# Patient Record
Sex: FEMALE | Race: WHITE | NOT HISPANIC OR LATINO | RURAL
[De-identification: names, ages, dates, MRNs, and addresses within clinical notes are randomized per-mention and may not be internally consistent; named-entity substitution may affect disease eponyms.]

---

## 2022-01-24 ENCOUNTER — OFFICE VISIT (OUTPATIENT)
Dept: FAMILY MEDICINE | Facility: CLINIC | Age: 62
End: 2022-01-24
Payer: COMMERCIAL

## 2022-01-24 VITALS
SYSTOLIC BLOOD PRESSURE: 134 MMHG | BODY MASS INDEX: 28.32 KG/M2 | TEMPERATURE: 98 F | OXYGEN SATURATION: 98 % | HEART RATE: 81 BPM | HEIGHT: 65 IN | DIASTOLIC BLOOD PRESSURE: 77 MMHG | WEIGHT: 170 LBS

## 2022-01-24 DIAGNOSIS — J02.9 ACUTE PHARYNGITIS, UNSPECIFIED ETIOLOGY: Primary | ICD-10-CM

## 2022-01-24 DIAGNOSIS — R05.9 COUGH: ICD-10-CM

## 2022-01-24 PROBLEM — E78.2 HYPERLIPIDEMIA, MIXED: Status: ACTIVE | Noted: 2020-01-24

## 2022-01-24 PROBLEM — K21.9 GERD (GASTROESOPHAGEAL REFLUX DISEASE): Status: ACTIVE | Noted: 2020-01-24

## 2022-01-24 PROBLEM — R42 VERTIGO: Status: ACTIVE | Noted: 2021-11-11

## 2022-01-24 PROBLEM — I10 PRIMARY HYPERTENSION: Status: ACTIVE | Noted: 2019-09-09

## 2022-01-24 PROBLEM — F41.8 DEPRESSION WITH ANXIETY: Status: ACTIVE | Noted: 2021-11-11

## 2022-01-24 PROBLEM — K58.9 IBS (IRRITABLE BOWEL SYNDROME): Status: ACTIVE | Noted: 2021-05-24

## 2022-01-24 PROBLEM — G45.9 TIA (TRANSIENT ISCHEMIC ATTACK): Status: ACTIVE | Noted: 2021-10-05

## 2022-01-24 PROBLEM — M81.0 AGE-RELATED OSTEOPOROSIS WITHOUT CURRENT PATHOLOGICAL FRACTURE: Status: ACTIVE | Noted: 2018-08-02

## 2022-01-24 LAB
CTP QC/QA: YES
SARS-COV-2 AG RESP QL IA.RAPID: NEGATIVE

## 2022-01-24 PROCEDURE — 4010F ACE/ARB THERAPY RXD/TAKEN: CPT | Mod: ,,, | Performed by: REGISTERED NURSE

## 2022-01-24 PROCEDURE — 99203 OFFICE O/P NEW LOW 30 MIN: CPT | Mod: ,,, | Performed by: REGISTERED NURSE

## 2022-01-24 PROCEDURE — 87426 SARS CORONAVIRUS 2 ANTIGEN POCT: ICD-10-PCS | Mod: QW,,, | Performed by: REGISTERED NURSE

## 2022-01-24 PROCEDURE — 3078F DIAST BP <80 MM HG: CPT | Mod: ,,, | Performed by: REGISTERED NURSE

## 2022-01-24 PROCEDURE — 3008F BODY MASS INDEX DOCD: CPT | Mod: ,,, | Performed by: REGISTERED NURSE

## 2022-01-24 PROCEDURE — 3008F PR BODY MASS INDEX (BMI) DOCUMENTED: ICD-10-PCS | Mod: ,,, | Performed by: REGISTERED NURSE

## 2022-01-24 PROCEDURE — 4010F PR ACE/ARB THEARPY RXD/TAKEN: ICD-10-PCS | Mod: ,,, | Performed by: REGISTERED NURSE

## 2022-01-24 PROCEDURE — 3075F SYST BP GE 130 - 139MM HG: CPT | Mod: ,,, | Performed by: REGISTERED NURSE

## 2022-01-24 PROCEDURE — 1159F PR MEDICATION LIST DOCUMENTED IN MEDICAL RECORD: ICD-10-PCS | Mod: ,,, | Performed by: REGISTERED NURSE

## 2022-01-24 PROCEDURE — 3078F PR MOST RECENT DIASTOLIC BLOOD PRESSURE < 80 MM HG: ICD-10-PCS | Mod: ,,, | Performed by: REGISTERED NURSE

## 2022-01-24 PROCEDURE — 1160F PR REVIEW ALL MEDS BY PRESCRIBER/CLIN PHARMACIST DOCUMENTED: ICD-10-PCS | Mod: ,,, | Performed by: REGISTERED NURSE

## 2022-01-24 PROCEDURE — 3075F PR MOST RECENT SYSTOLIC BLOOD PRESS GE 130-139MM HG: ICD-10-PCS | Mod: ,,, | Performed by: REGISTERED NURSE

## 2022-01-24 PROCEDURE — 99203 PR OFFICE/OUTPT VISIT, NEW, LEVL III, 30-44 MIN: ICD-10-PCS | Mod: ,,, | Performed by: REGISTERED NURSE

## 2022-01-24 PROCEDURE — 87426 SARSCOV CORONAVIRUS AG IA: CPT | Mod: QW,,, | Performed by: REGISTERED NURSE

## 2022-01-24 PROCEDURE — 1159F MED LIST DOCD IN RCRD: CPT | Mod: ,,, | Performed by: REGISTERED NURSE

## 2022-01-24 PROCEDURE — 1160F RVW MEDS BY RX/DR IN RCRD: CPT | Mod: ,,, | Performed by: REGISTERED NURSE

## 2022-01-24 RX ORDER — AZITHROMYCIN 250 MG/1
TABLET, FILM COATED ORAL
Qty: 6 TABLET | Refills: 0 | Status: SHIPPED | OUTPATIENT
Start: 2022-01-24 | End: 2022-01-29

## 2022-01-24 RX ORDER — LISINOPRIL AND HYDROCHLOROTHIAZIDE 10; 12.5 MG/1; MG/1
1 TABLET ORAL
COMMUNITY
Start: 2021-05-24

## 2022-01-24 RX ORDER — PROMETHAZINE HYDROCHLORIDE AND DEXTROMETHORPHAN HYDROBROMIDE 6.25; 15 MG/5ML; MG/5ML
5 SYRUP ORAL EVERY 8 HOURS PRN
Qty: 180 ML | Refills: 0 | Status: SHIPPED | OUTPATIENT
Start: 2022-01-24 | End: 2022-02-03

## 2022-01-24 RX ORDER — CHLORPHENIRAMINE MALEATE AND PHENYLEPHRINE HYDROCHLORIDE 4; 10 MG/1; MG/1
1 TABLET, COATED ORAL EVERY 4 HOURS PRN
Qty: 25 TABLET | Refills: 0 | Status: SHIPPED | OUTPATIENT
Start: 2022-01-24 | End: 2022-01-31

## 2022-01-24 RX ORDER — ALPRAZOLAM 0.25 MG/1
0.25 TABLET ORAL
COMMUNITY
Start: 2022-01-10

## 2022-01-24 RX ORDER — METOPROLOL SUCCINATE 50 MG/1
50 TABLET, EXTENDED RELEASE ORAL
COMMUNITY
Start: 2021-05-24

## 2022-01-24 RX ORDER — FENOFIBRATE 160 MG/1
160 TABLET ORAL
COMMUNITY
Start: 2021-05-24

## 2022-01-24 RX ORDER — BUPROPION HYDROCHLORIDE 300 MG/1
300 TABLET ORAL
COMMUNITY
Start: 2021-05-24

## 2022-01-24 RX ORDER — ATORVASTATIN CALCIUM 40 MG/1
40 TABLET, FILM COATED ORAL
COMMUNITY
Start: 2021-05-24

## 2022-01-24 RX ORDER — ACYCLOVIR 800 MG/1
800 TABLET ORAL
COMMUNITY
Start: 2021-06-28 | End: 2022-02-08

## 2022-01-24 NOTE — PROGRESS NOTES
KHALIDA Mann        PATIENT NAME: Lola Buckner  : 1960  DATE: 22  MRN: [id number]      Billing Provider: KHALIDA Mann  Level of Service:   Patient PCP Information     Provider PCP Type    Primary Doctor No General          Reason for Visit / Chief Complaint: Sore Throat, Cough (X 3 weeks. Productive green, /hoarse), and Nasal Congestion (Nasal drainage)       Update PCP  Update Chief Complaint         History of Present Illness / Problem Focused Workflow     Lola Buckner presents to the clinic with Sore Throat, Cough (X 3 weeks. Productive green, /hoarse), and Nasal Congestion (Nasal drainage)     HPI 60 y/o WF has symptoms of cold but states it could be covid or flu. She was COVID positive three weeks ago and got over all symptoms but states 4 days ago she started having problems again.     Review of Systems     Review of Systems   Constitutional: Positive for activity change, appetite change, chills and fatigue.   HENT: Positive for nasal congestion, mouth dryness, postnasal drip, sinus pressure/congestion, sore throat, trouble swallowing and voice change.    Eyes: Negative.    Respiratory: Positive for cough and chest tightness.    Cardiovascular: Negative.    Gastrointestinal: Negative.    Genitourinary: Negative.    Musculoskeletal: Positive for myalgias.   Psychiatric/Behavioral: Negative.         Medical / Social / Family History     Past Medical History:   Diagnosis Date    Anxiety     Depression     Hyperlipidemia     Hypertension        Past Surgical History:   Procedure Laterality Date    HYSTERECTOMY         Social History  Ms. Lola Buckner  reports that she has never smoked. She has never used smokeless tobacco. She reports previous alcohol use. She reports that she does not use drugs.    Family History  Ms. Lola Buckner's family history is not on file.    Medications and Allergies     Medications  Outpatient Medications Marked as Taking for the  1/24/22 encounter (Office Visit) with KHALIDA Mann   Medication Sig Dispense Refill    acyclovir (ZOVIRAX) 800 MG Tab Take 800 mg by mouth.      ALPRAZolam (XANAX) 0.25 MG tablet Take 0.25 mg by mouth.      atorvastatin (LIPITOR) 40 MG tablet Take 40 mg by mouth.      buPROPion (WELLBUTRIN XL) 300 MG 24 hr tablet Take 300 mg by mouth.      fenofibrate 160 MG Tab Take 160 mg by mouth.      lisinopriL-hydrochlorothiazide (PRINZIDE,ZESTORETIC) 10-12.5 mg per tablet Take 1 tablet by mouth.      metoprolol succinate (TOPROL-XL) 50 MG 24 hr tablet Take 50 mg by mouth.         Allergies  Review of patient's allergies indicates:   Allergen Reactions    Meclizine Shortness Of Breath    Prednisone Other (See Comments), Hives and Shortness Of Breath     Hallucinations  Hallucinations      Shellfish containing products Hallucinations and Other (See Comments)       Physical Examination     Vitals:    01/24/22 0920   BP: 134/77   Pulse: 81   Temp: 98 °F (36.7 °C)     Physical Exam  Constitutional:       Appearance: She is ill-appearing.   HENT:      Head: Normocephalic and atraumatic.      Nose: Congestion present.      Mouth/Throat:      Mouth: Mucous membranes are dry.      Pharynx: Posterior oropharyngeal erythema present.   Cardiovascular:      Rate and Rhythm: Normal rate and regular rhythm.      Heart sounds: Normal heart sounds.   Pulmonary:      Effort: Pulmonary effort is normal.      Breath sounds: Normal breath sounds.   Musculoskeletal:         General: Normal range of motion.      Cervical back: Normal range of motion.   Skin:     General: Skin is warm and dry.   Neurological:      Mental Status: She is alert and oriented to person, place, and time.   Psychiatric:         Mood and Affect: Mood normal.         Behavior: Behavior normal.          Assessment and Plan (including Health Maintenance)      Problem List  Smart Sets  Document Outside HM   :    Plan:   Acute pharyngitis, unspecified  etiology  -     azithromycin (Z-LAURA) 250 MG tablet; Take 2 tablets by mouth on day 1; Take 1 tablet by mouth on days 2-5  Dispense: 6 tablet; Refill: 0  -     promethazine-dextromethorphan (PROMETHAZINE-DM) 6.25-15 mg/5 mL Syrp; Take 5 mLs by mouth every 8 (eight) hours as needed (cough).  Dispense: 180 mL; Refill: 0  -     chlorpheniramine-phenylephrine (ED A-HIST) 4-10 mg per tablet; Take 1 tablet by mouth every 4 (four) hours as needed for Congestion.  Dispense: 25 tablet; Refill: 0    Cough  -     Cancel: POCT SARS-COV2 (COVID) with Flu Antigen  -     SARS Coronavirus 2 Antigen, POCT  -     promethazine-dextromethorphan (PROMETHAZINE-DM) 6.25-15 mg/5 mL Syrp; Take 5 mLs by mouth every 8 (eight) hours as needed (cough).  Dispense: 180 mL; Refill: 0           Health Maintenance Due   Topic Date Due    Hepatitis C Screening  Never done    COVID-19 Vaccine (1) Never done    HIV Screening  Never done    TETANUS VACCINE  Never done    Mammogram  Never done    Colorectal Cancer Screening  Never done    Shingles Vaccine (1 of 2) Never done       Problem List Items Addressed This Visit        Pulmonary    Cough    Relevant Medications    promethazine-dextromethorphan (PROMETHAZINE-DM) 6.25-15 mg/5 mL Syrp    Other Relevant Orders    SARS Coronavirus 2 Antigen, POCT (Completed)      Other Visit Diagnoses     Acute pharyngitis, unspecified etiology    -  Primary    Relevant Medications    azithromycin (Z-LAURA) 250 MG tablet    promethazine-dextromethorphan (PROMETHAZINE-DM) 6.25-15 mg/5 mL Syrp    chlorpheniramine-phenylephrine (ED A-HIST) 4-10 mg per tablet          Health Maintenance Topics with due status: Not Due       Topic Last Completion Date    Lipid Panel 11/11/2021       No future appointments.     Patient Instructions     Increase fluid intake to stay hydrated. Stay active, get up and walk around at least every 2 hours while awake. Take Tylenol or ibuprofen for fever or body aches. Go to the ER if you have  any trouble breathing.     Patient Education       Sore Throat in Adults   The Basics   Written by the doctors and editors at [facility]   When should I see a doctor or nurse about a sore throat? -- Most people do not need to see a doctor about a sore throat. It usually gets better on its own. But sore throat can sometimes be serious.  See a doctor or nurse if:  · You have a fever of at least 101°F or 38.4°C  · Your throat pain is severe within the first 2 days, or does not start to improve within 5 to 7 days  During the coronavirus disease 2019 (COVID-19) pandemic, you should also call your doctor if you have a sore throat. They will ask you questions about your symptoms and whether you might be at risk. They can also tell you if you should get tested for the virus.  Call for an ambulance (in the [place] and [place], dial 9-1-1) or go to the emergency room if you:  · Have trouble breathing  · Are drooling because you cannot swallow your saliva  · Have swelling of the neck or tongue  · Cannot move your neck or have trouble opening your mouth  What causes sore throat? -- Sore throat is usually caused by an infection. Two types of germs can cause it: viruses and bacteria. People who have a sore throat caused by a virus do not usually need to see a doctor or nurse. However, during the COVID-19 pandemic, most people with a sore throat will need to be tested for the virus that causes COVID-19  People who have a sore throat caused by bacteria might need to see a doctor or nurse. They might have a type of infection called strep throat. Only about 1 in 10 adults who seek medical care for sore throat have strep throat.   How can I tell if my sore throat is caused by a virus or strep throat? -- It is hard to tell the difference. But there are some clues to look for.  People who have a sore throat caused by a virus usually have other symptoms, such as:  · A runny nose  · A stuffed-up chest  · Itchy or red eyes  · Cough  People who  "have COVID-19 can have different symptoms including fever, cough, trouble breathing, and problems with their sense of smell or taste. Some people have other symptoms, too. In most cases, the only way to know for sure if you have COVID-19 is to get tested.  People who have a sore throat caused by strep throat do not usually have a cough, runny nose, or itchy or red eyes. They might have been in close contact with another person who has strep throat. They might also have:  · Severe throat pain  · Fever (temperature higher than 100.4°F or 38°C)  · Swollen glands in the neck  · A rash  If you think you have strep throat, the doctor or nurse can check you for it easily. They can run a swab (Q-Tip) along the back of your throat and test it for the bacteria that cause strep throat.  Do I need antibiotics? -- If you have an infection caused by a virus, you do not need antibiotics. But if you have strep throat, you should get antibiotics. Most people with strep throat get better without antibiotics, but doctors and nurses often prescribe them anyway. That's because antibiotics can prevent problems sometimes caused by strep throat. Plus, antibiotics can reduce the symptoms of strep throat and prevent its spread to other people.  What can I do to feel better? -- If you want some relief from the pain of sore throat, you can take pain medicine that you can get without a prescription. Throat sprays are no better at soothing pain than sucking on cough drops or candy. Some people feel relief if they gargle with salt water.  When can I go back to work or school? -- If you have strep throat, wait 1 day after starting antibiotics. By then you will be a lot less likely to spread the infection.  If you have COVID-19, stay home from work or school and "self-isolate" until your doctor or nurse tells you it's safe to stop. Self-isolation means staying apart from other people, even the people you live with. When you can stop self-isolation " will depend on how long it has been since you had symptoms, and in some cases, whether you have had a negative test (showing that the virus is no longer in your body).  If you have a sore throat that is not due to strep throat or COVID-19, you can go back to your usual activities as soon as you feel well. But it's still a good idea to wash your hands often and cover your mouth if you cough.  What can I do to prevent getting a sore throat again? -- Wash your hands often with soap and water. It is one of the best ways to prevent the spread of infection. The table has instructions on how to wash your hands to prevent spreading illness (table 1).  All topics are updated as new evidence becomes available and our peer review process is complete.  This topic retrieved from [employer] on: Sep 21, 2021.  Topic 05723 Version 16.0  Release: 29.4.2 - C29.263  © 2021 UpToDate, Inc. and/or its affiliates. All rights reserved.  table 1: Hand washing to prevent spreading illness  · Wet your hands and put soap on them    · Rub your hands together for at least 20 seconds. Make sure to clean your wrists, fingernails, and in between your fingers.    · Rinse your hands    · Dry your hands with a paper towel that you can throw away    If you are not near a sink, you can use a hand gel to clean your hands. The gels with at least 60 percent alcohol work the best. But it is better to wash with soap and water if you can.  Graphic 242610 Version 3.0  Consumer Information Use and Disclaimer   This information is not specific medical advice and does not replace information you receive from your health care provider. This is only a brief summary of general information. It does NOT include all information about conditions, illnesses, injuries, tests, procedures, treatments, therapies, discharge instructions or life-style choices that may apply to you. You must talk with your health care provider for complete information about your health and  treatment options. This information should not be used to decide whether or not to accept your health care provider's advice, instructions or recommendations. Only your health care provider has the knowledge and training to provide advice that is right for you. The use of this information is governed by the [place] End User License Agreement, available at https://www.ACTON.Uanbai/en/solutions/Pull/about/yash.The use of [employer] content is governed by the [employer] Terms of Use. ©2021 UpToDate, Inc. All rights reserved.  Copyright   © 2021 UpToDate, Inc. and/or its affiliates. All rights reserved.  Patient Education       Cough, Runny Nose, and the Common Cold   The Basics   Written by the doctors and editors at [facility]   What causes cough, runny nose, and other symptoms of the common cold? -- These symptoms are usually caused by a viral infection. Lots of different viruses can take hold inside your nose, mouth, throat, or airways and cause cold symptoms.  Most people get over a cold without any lasting problems. Even so, having a cold can be uncomfortable. Also, some cold symptoms can also be caused by other illnesses, such as coronavirus 2019 (COVID-19) or the flu.  What are the symptoms of the common cold? -- The symptoms include:  · Sneezing  · Coughing  · Sniffling and runny nose  · Sore throat  · Chest congestion  In children, the common cold can also cause a fever. But adults do not usually get a fever when they have a cold. Some symptoms of the common cold can overlap with symptoms of COVID-19, although sneezing is uncommon in COVID-19.   When should I call the doctor or nurse? -- Contact your doctor or nurse if you live in an area where people have COVID-19. They will ask you questions about your symptoms and whether you might be at risk. They can tell you if you should get tested for the virus that causes COVID-19. If they think you are more likely to just have a cold, they might tell you to stay  home and contact them again if your symptoms change or get worse.   You should also contact your doctor or nurse if you:  · Lose your sense of taste or smell  · Have a fever of more than 100.4º F (38º C) that comes with shaking chills, loss of appetite, or trouble breathing  · Have a fever and also have lung disease, such as emphysema or asthma  · Have a cough that lasts longer than 10 days  · Have chest pain when you cough or breathe deeply, have trouble breathing, or cough up blood  If you are older than 65, or if you have any chronic medical conditions such as diabetes, you should contact your doctor or nurse any time you get a long-lasting cough.  Take your child to the emergency room if they:  · Become confused or stop responding to you  · Have trouble breathing or have to work hard to breathe  Contact your child's doctor or nurse if the child:  · Refuses to drink anything for a long time  · Is younger than 4 months  · Has a fever and is not acting like themself  · Has a cough that lasts for more than 2 weeks and is not getting any better  · Has a stuffed or runny nose that gets worse or does not get any better after 10 days  · Has red eyes or yellow goop coming out of their eyes  · Has ear pain, pulls at their ears, or shows other signs of having an ear infection  What can I do to feel better? -- If you are a teenager or an adult, you can try cough and cold medicines that you can get without a prescription. These medicines might help with your symptoms. But they won't cure your cold, or help you get well faster.  If you decide to try nonprescription cold medicines, be sure to follow the directions on the label. Do not combine 2 or more medicines that have acetaminophen in them. If you take too much acetaminophen, the drug can damage your liver. Also, if you have a heart condition, high blood pressure, or you take any prescription medicines, ask your pharmacist if it is safe to take the cold medicine you have  in mind.  What should I know if my child has a cold? -- In children, the common cold is often more severe than it is in adults. It also lasts longer. Plus, children often get a fever during the first 3 days of a cold.  Are cough and cold medicines safe for children? -- If your child is younger than 6, you should not give them any cold medicines. These medicines are not safe for young children. Even if your child is older than 6, cough and cold medicines are unlikely to help.  Never give aspirin to any child younger than 18 years old. In children, aspirin can cause a life-threatening condition called Reye syndrome. When giving your child acetaminophen or other nonprescription medicines, never give more than the recommended dose.  How long will I be sick? -- Colds usually last 3 to 7 days in adults and 10 days in children, but some people have symptoms for up to 2 weeks.  Can the common cold lead to more serious problems? -- In some cases, yes. In some people having a cold can lead to:  · Ear infections  · Worsening of asthma symptoms  · Sinus infections  · Pneumonia or bronchitis (infections of the lungs)  How can I keep from getting another cold? -- The most important thing you can do is to wash your hands often with soap and water. This can also prevent the spread of other illnesses like the flu and COVID-19. The table has instructions on how to wash your hands to prevent spreading illness (table 1).  The germs that cause the common cold can live on tables, door handles, and other surfaces for at least 2 hours. You never know when you might be touching germs. That's why it's so important to clean your hands often.  It's also important to stay away from other people when you are sick. This will help prevent the spread of illness.  All topics are updated as new evidence becomes available and our peer review process is complete.  This topic retrieved from [employer] on: Sep 21, 2021.  Topic 72045 Version 20.0  Release:  29.4.2 - C29.263  © 2021 UpToDate, Inc. and/or its affiliates. All rights reserved.  table 1: Hand washing to prevent spreading illness  · Wet your hands and put soap on them    · Rub your hands together for at least 20 seconds. Make sure to clean your wrists, fingernails, and in between your fingers.    · Rinse your hands    · Dry your hands with a paper towel that you can throw away    If you are not near a sink, you can use a hand gel to clean your hands. The gels with at least 60 percent alcohol work the best. But it is better to wash with soap and water if you can.  Graphic 111975 Version 3.0  Consumer Information Use and Disclaimer   This information is not specific medical advice and does not replace information you receive from your health care provider. This is only a brief summary of general information. It does NOT include all information about conditions, illnesses, injuries, tests, procedures, treatments, therapies, discharge instructions or life-style choices that may apply to you. You must talk with your health care provider for complete information about your health and treatment options. This information should not be used to decide whether or not to accept your health care provider's advice, instructions or recommendations. Only your health care provider has the knowledge and training to provide advice that is right for you. The use of this information is governed by the [employer] End User License Agreement, available at https://www.MarketYze.[employer]/en/solutions/Digital Health Dialog/about/yash.The use of [employer] content is governed by the [employer] Terms of Use. ©2021 UpToDate, Inc. All rights reserved.  Copyright   © 2021 UpToDate, Inc. and/or its affiliates. All rights reserved.      No follow-ups on file.     Signature:  KHALIDA Mann      Date of encounter: 1/24/22

## 2022-01-24 NOTE — PATIENT INSTRUCTIONS
Increase fluid intake to stay hydrated. Stay active, get up and walk around at least every 2 hours while awake. Take Tylenol or ibuprofen for fever or body aches. Go to the ER if you have any trouble breathing.     Patient Education       Sore Throat in Adults   The Basics   Written by the doctors and editors at [facility]   When should I see a doctor or nurse about a sore throat? -- Most people do not need to see a doctor about a sore throat. It usually gets better on its own. But sore throat can sometimes be serious.  See a doctor or nurse if:  · You have a fever of at least 101°F or 38.4°C  · Your throat pain is severe within the first 2 days, or does not start to improve within 5 to 7 days  During the coronavirus disease 2019 (COVID-19) pandemic, you should also call your doctor if you have a sore throat. They will ask you questions about your symptoms and whether you might be at risk. They can also tell you if you should get tested for the virus.  Call for an ambulance (in the [place] and [place], dial [id number]) or go to the emergency room if you:  · Have trouble breathing  · Are drooling because you cannot swallow your saliva  · Have swelling of the neck or tongue  · Cannot move your neck or have trouble opening your mouth  What causes sore throat? -- Sore throat is usually caused by an infection. Two types of germs can cause it: viruses and bacteria. People who have a sore throat caused by a virus do not usually need to see a doctor or nurse. However, during the COVID-19 pandemic, most people with a sore throat will need to be tested for the virus that causes COVID-19  People who have a sore throat caused by bacteria might need to see a doctor or nurse. They might have a type of infection called strep throat. Only about 1 in 10 adults who seek medical care for sore throat have strep throat.   How can I tell if my sore throat is caused by a virus or strep throat? -- It is hard to tell the difference. But there are some  clues to look for.  People who have a sore throat caused by a virus usually have other symptoms, such as:  · A runny nose  · A stuffed-up chest  · Itchy or red eyes  · Cough  People who have COVID-19 can have different symptoms including fever, cough, trouble breathing, and problems with their sense of smell or taste. Some people have other symptoms, too. In most cases, the only way to know for sure if you have COVID-19 is to get tested.  People who have a sore throat caused by strep throat do not usually have a cough, runny nose, or itchy or red eyes. They might have been in close contact with another person who has strep throat. They might also have:  · Severe throat pain  · Fever (temperature higher than 100.4°F or 38°C)  · Swollen glands in the neck  · A rash  If you think you have strep throat, the doctor or nurse can check you for it easily. They can run a swab (Q-Tip) along the back of your throat and test it for the bacteria that cause strep throat.  Do I need antibiotics? -- If you have an infection caused by a virus, you do not need antibiotics. But if you have strep throat, you should get antibiotics. Most people with strep throat get better without antibiotics, but doctors and nurses often prescribe them anyway. That's because antibiotics can prevent problems sometimes caused by strep throat. Plus, antibiotics can reduce the symptoms of strep throat and prevent its spread to other people.  What can I do to feel better? -- If you want some relief from the pain of sore throat, you can take pain medicine that you can get without a prescription. Throat sprays are no better at soothing pain than sucking on cough drops or candy. Some people feel relief if they gargle with salt water.  When can I go back to work or school? -- If you have strep throat, wait 1 day after starting antibiotics. By then you will be a lot less likely to spread the infection.  If you have COVID-19, stay home from work or school and  ""self-isolate" until your doctor or nurse tells you it's safe to stop. Self-isolation means staying apart from other people, even the people you live with. When you can stop self-isolation will depend on how long it has been since you had symptoms, and in some cases, whether you have had a negative test (showing that the virus is no longer in your body).  If you have a sore throat that is not due to strep throat or COVID-19, you can go back to your usual activities as soon as you feel well. But it's still a good idea to wash your hands often and cover your mouth if you cough.  What can I do to prevent getting a sore throat again? -- Wash your hands often with soap and water. It is one of the best ways to prevent the spread of infection. The table has instructions on how to wash your hands to prevent spreading illness (table 1).  All topics are updated as new evidence becomes available and our peer review process is complete.  This topic retrieved from [employer] on: Sep 21, 2021.  Topic 53091 Version 16.0  Release: 29.4.2 - C29.263  © 2021 UpToDate, Inc. and/or its affiliates. All rights reserved.  table 1: Hand washing to prevent spreading illness  · Wet your hands and put soap on them    · Rub your hands together for at least 20 seconds. Make sure to clean your wrists, fingernails, and in between your fingers.    · Rinse your hands    · Dry your hands with a paper towel that you can throw away    If you are not near a sink, you can use a hand gel to clean your hands. The gels with at least 60 percent alcohol work the best. But it is better to wash with soap and water if you can.  Graphic 594290 Version 3.0  Consumer Information Use and Disclaimer   This information is not specific medical advice and does not replace information you receive from your health care provider. This is only a brief summary of general information. It does NOT include all information about conditions, illnesses, injuries, tests, procedures, " treatments, therapies, discharge instructions or life-style choices that may apply to you. You must talk with your health care provider for complete information about your health and treatment options. This information should not be used to decide whether or not to accept your health care provider's advice, instructions or recommendations. Only your health care provider has the knowledge and training to provide advice that is right for you. The use of this information is governed by the [employer] End User License Agreement, available at https://www.[employer]/en/solutions/[employer]/about/yash.The use of [employer] content is governed by the [employer] Terms of Use. ©2021 UpToDate, Inc. All rights reserved.  Copyright   © 2021 UpToDate, Inc. and/or its affiliates. All rights reserved.  Patient Education       Cough, Runny Nose, and the Common Cold   The Basics   Written by the doctors and editors at [facility]   What causes cough, runny nose, and other symptoms of the common cold? -- These symptoms are usually caused by a viral infection. Lots of different viruses can take hold inside your nose, mouth, throat, or airways and cause cold symptoms.  Most people get over a cold without any lasting problems. Even so, having a cold can be uncomfortable. Also, some cold symptoms can also be caused by other illnesses, such as coronavirus 2019 (COVID-19) or the flu.  What are the symptoms of the common cold? -- The symptoms include:  · Sneezing  · Coughing  · Sniffling and runny nose  · Sore throat  · Chest congestion  In children, the common cold can also cause a fever. But adults do not usually get a fever when they have a cold. Some symptoms of the common cold can overlap with symptoms of COVID-19, although sneezing is uncommon in COVID-19.   When should I call the doctor or nurse? -- Contact your doctor or nurse if you live in an area where people have COVID-19. They will ask you questions about your symptoms and whether  you might be at risk. They can tell you if you should get tested for the virus that causes COVID-19. If they think you are more likely to just have a cold, they might tell you to stay home and contact them again if your symptoms change or get worse.   You should also contact your doctor or nurse if you:  · Lose your sense of taste or smell  · Have a fever of more than 100.4º F (38º C) that comes with shaking chills, loss of appetite, or trouble breathing  · Have a fever and also have lung disease, such as emphysema or asthma  · Have a cough that lasts longer than 10 days  · Have chest pain when you cough or breathe deeply, have trouble breathing, or cough up blood  If you are older than 65, or if you have any chronic medical conditions such as diabetes, you should contact your doctor or nurse any time you get a long-lasting cough.  Take your child to the emergency room if they:  · Become confused or stop responding to you  · Have trouble breathing or have to work hard to breathe  Contact your child's doctor or nurse if the child:  · Refuses to drink anything for a long time  · Is younger than 4 months  · Has a fever and is not acting like themself  · Has a cough that lasts for more than 2 weeks and is not getting any better  · Has a stuffed or runny nose that gets worse or does not get any better after 10 days  · Has red eyes or yellow goop coming out of their eyes  · Has ear pain, pulls at their ears, or shows other signs of having an ear infection  What can I do to feel better? -- If you are a teenager or an adult, you can try cough and cold medicines that you can get without a prescription. These medicines might help with your symptoms. But they won't cure your cold, or help you get well faster.  If you decide to try nonprescription cold medicines, be sure to follow the directions on the label. Do not combine 2 or more medicines that have acetaminophen in them. If you take too much acetaminophen, the drug can  damage your liver. Also, if you have a heart condition, high blood pressure, or you take any prescription medicines, ask your pharmacist if it is safe to take the cold medicine you have in mind.  What should I know if my child has a cold? -- In children, the common cold is often more severe than it is in adults. It also lasts longer. Plus, children often get a fever during the first 3 days of a cold.  Are cough and cold medicines safe for children? -- If your child is younger than 6, you should not give them any cold medicines. These medicines are not safe for young children. Even if your child is older than 6, cough and cold medicines are unlikely to help.  Never give aspirin to any child younger than 18 years old. In children, aspirin can cause a life-threatening condition called Reye syndrome. When giving your child acetaminophen or other nonprescription medicines, never give more than the recommended dose.  How long will I be sick? -- Colds usually last 3 to 7 days in adults and 10 days in children, but some people have symptoms for up to 2 weeks.  Can the common cold lead to more serious problems? -- In some cases, yes. In some people having a cold can lead to:  · Ear infections  · Worsening of asthma symptoms  · Sinus infections  · Pneumonia or bronchitis (infections of the lungs)  How can I keep from getting another cold? -- The most important thing you can do is to wash your hands often with soap and water. This can also prevent the spread of other illnesses like the flu and COVID-19. The table has instructions on how to wash your hands to prevent spreading illness (table 1).  The germs that cause the common cold can live on tables, door handles, and other surfaces for at least 2 hours. You never know when you might be touching germs. That's why it's so important to clean your hands often.  It's also important to stay away from other people when you are sick. This will help prevent the spread of  illness.  All topics are updated as new evidence becomes available and our peer review process is complete.  This topic retrieved from [employer] on: Sep 21, 2021.  Topic 84139 Version 20.0  Release: 29.4.2 - C29.263  © 2021 UpToDate, Inc. and/or its affiliates. All rights reserved.  table 1: Hand washing to prevent spreading illness  · Wet your hands and put soap on them    · Rub your hands together for at least 20 seconds. Make sure to clean your wrists, fingernails, and in between your fingers.    · Rinse your hands    · Dry your hands with a paper towel that you can throw away    If you are not near a sink, you can use a hand gel to clean your hands. The gels with at least 60 percent alcohol work the best. But it is better to wash with soap and water if you can.  Graphic 509780 Version 3.0  Consumer Information Use and Disclaimer   This information is not specific medical advice and does not replace information you receive from your health care provider. This is only a brief summary of general information. It does NOT include all information about conditions, illnesses, injuries, tests, procedures, treatments, therapies, discharge instructions or life-style choices that may apply to you. You must talk with your health care provider for complete information about your health and treatment options. This information should not be used to decide whether or not to accept your health care provider's advice, instructions or recommendations. Only your health care provider has the knowledge and training to provide advice that is right for you. The use of this information is governed by the [employer] End User License Agreement, available at https://www.[employer]/en/solutions/[employer]/about/yash.The use of [employer] content is governed by the [employer] Terms of Use. ©2021 UpToDate, Inc. All rights reserved.  Copyright   © 2021 UpToDate, Inc. and/or its affiliates. All rights reserved.

## 2022-02-08 ENCOUNTER — OFFICE VISIT (OUTPATIENT)
Dept: FAMILY MEDICINE | Facility: CLINIC | Age: 62
End: 2022-02-08
Payer: COMMERCIAL

## 2022-02-08 VITALS
WEIGHT: 170 LBS | SYSTOLIC BLOOD PRESSURE: 133 MMHG | DIASTOLIC BLOOD PRESSURE: 79 MMHG | HEIGHT: 65 IN | HEART RATE: 73 BPM | TEMPERATURE: 96 F | BODY MASS INDEX: 28.32 KG/M2 | OXYGEN SATURATION: 95 %

## 2022-02-08 DIAGNOSIS — R52 BODY ACHES: ICD-10-CM

## 2022-02-08 DIAGNOSIS — R05.9 COUGH: ICD-10-CM

## 2022-02-08 DIAGNOSIS — J06.9 UPPER RESPIRATORY INFECTION WITH COUGH AND CONGESTION: Primary | ICD-10-CM

## 2022-02-08 DIAGNOSIS — J02.9 SORE THROAT: ICD-10-CM

## 2022-02-08 PROBLEM — R53.1 ACUTE RIGHT-SIDED WEAKNESS: Status: ACTIVE | Noted: 2021-10-05

## 2022-02-08 LAB
CTP QC/QA: YES
CTP QC/QA: YES
FLUAV AG NPH QL: NEGATIVE
FLUBV AG NPH QL: NEGATIVE
S PYO RRNA THROAT QL PROBE: NEGATIVE
SARS-COV-2 AG RESP QL IA.RAPID: NEGATIVE

## 2022-02-08 PROCEDURE — 87880 POCT RAPID STREP A: ICD-10-PCS | Mod: QW,,, | Performed by: REGISTERED NURSE

## 2022-02-08 PROCEDURE — 87428 POCT SARS-COV2 (COVID) WITH FLU ANTIGEN: ICD-10-PCS | Mod: QW,,, | Performed by: REGISTERED NURSE

## 2022-02-08 PROCEDURE — 1160F PR REVIEW ALL MEDS BY PRESCRIBER/CLIN PHARMACIST DOCUMENTED: ICD-10-PCS | Mod: ,,, | Performed by: REGISTERED NURSE

## 2022-02-08 PROCEDURE — 99213 PR OFFICE/OUTPT VISIT, EST, LEVL III, 20-29 MIN: ICD-10-PCS | Mod: ,,, | Performed by: REGISTERED NURSE

## 2022-02-08 PROCEDURE — 3078F DIAST BP <80 MM HG: CPT | Mod: ,,, | Performed by: REGISTERED NURSE

## 2022-02-08 PROCEDURE — 1160F RVW MEDS BY RX/DR IN RCRD: CPT | Mod: ,,, | Performed by: REGISTERED NURSE

## 2022-02-08 PROCEDURE — 99213 OFFICE O/P EST LOW 20 MIN: CPT | Mod: ,,, | Performed by: REGISTERED NURSE

## 2022-02-08 PROCEDURE — 3008F BODY MASS INDEX DOCD: CPT | Mod: ,,, | Performed by: REGISTERED NURSE

## 2022-02-08 PROCEDURE — 3075F SYST BP GE 130 - 139MM HG: CPT | Mod: ,,, | Performed by: REGISTERED NURSE

## 2022-02-08 PROCEDURE — 87428 SARSCOV & INF VIR A&B AG IA: CPT | Mod: QW,,, | Performed by: REGISTERED NURSE

## 2022-02-08 PROCEDURE — 4010F ACE/ARB THERAPY RXD/TAKEN: CPT | Mod: ,,, | Performed by: REGISTERED NURSE

## 2022-02-08 PROCEDURE — 3078F PR MOST RECENT DIASTOLIC BLOOD PRESSURE < 80 MM HG: ICD-10-PCS | Mod: ,,, | Performed by: REGISTERED NURSE

## 2022-02-08 PROCEDURE — 4010F PR ACE/ARB THEARPY RXD/TAKEN: ICD-10-PCS | Mod: ,,, | Performed by: REGISTERED NURSE

## 2022-02-08 PROCEDURE — 1159F MED LIST DOCD IN RCRD: CPT | Mod: ,,, | Performed by: REGISTERED NURSE

## 2022-02-08 PROCEDURE — 1159F PR MEDICATION LIST DOCUMENTED IN MEDICAL RECORD: ICD-10-PCS | Mod: ,,, | Performed by: REGISTERED NURSE

## 2022-02-08 PROCEDURE — 87880 STREP A ASSAY W/OPTIC: CPT | Mod: QW,,, | Performed by: REGISTERED NURSE

## 2022-02-08 PROCEDURE — 3008F PR BODY MASS INDEX (BMI) DOCUMENTED: ICD-10-PCS | Mod: ,,, | Performed by: REGISTERED NURSE

## 2022-02-08 PROCEDURE — 3075F PR MOST RECENT SYSTOLIC BLOOD PRESS GE 130-139MM HG: ICD-10-PCS | Mod: ,,, | Performed by: REGISTERED NURSE

## 2022-02-08 RX ORDER — VIT C/E/ZN/COPPR/LUTEIN/ZEAXAN 250MG-90MG
CAPSULE ORAL
COMMUNITY
End: 2022-04-29

## 2022-02-08 RX ORDER — PNV NO.95/FERROUS FUM/FOLIC AC 28MG-0.8MG
1000 TABLET ORAL
COMMUNITY
End: 2022-04-29

## 2022-02-08 RX ORDER — NIACIN 50 MG
50 TABLET ORAL
COMMUNITY
End: 2022-04-29

## 2022-02-08 RX ORDER — DOXYCYCLINE 100 MG/1
100 CAPSULE ORAL EVERY 12 HOURS
Qty: 20 CAPSULE | Refills: 0 | Status: SHIPPED | OUTPATIENT
Start: 2022-02-08 | End: 2022-02-18

## 2022-02-08 RX ORDER — BENZONATATE 100 MG/1
100 CAPSULE ORAL 3 TIMES DAILY PRN
Qty: 30 CAPSULE | Refills: 0 | Status: SHIPPED | OUTPATIENT
Start: 2022-02-08 | End: 2022-02-18

## 2022-02-08 NOTE — PROGRESS NOTES
KHALIDA Mann        PATIENT NAME: Lola Buckner  : 1960  DATE: 22  MRN: [id number]      Billing Provider: KHALIDA Mann  Level of Service: [place] OFFICE/OUTPT VISIT, EST, LEVL III, 20-29 MIN  Patient PCP Information     Provider PCP Type    Primary Doctor No General          Reason for Visit / Chief Complaint: Sore Throat (hoarse), Cough, and Nasal Congestion       Update PCP  Update Chief Complaint         History of Present Illness / Problem Focused Workflow     Lola Buckner presents to the clinic with Sore Throat (hoarse), Cough, and Nasal Congestion     HPI Mrs. Buckner was seen on 2022 for cough, congestion, and headache. She was negative for Flu and COVID at that time. She was treated for pharyngitis and encouraged to return if condition did not improve or if condition persisted. She is here today with continued complaint of cough, nasal congestion, and sore throat. She believes she has Strep throat but denies known exposure to anyone with Strep.     Review of Systems     Review of Systems   Constitutional: Positive for activity change, appetite change, chills and fatigue.   HENT: Positive for nasal congestion and sore throat.    Respiratory: Positive for cough and shortness of breath.    Cardiovascular: Negative.    Neurological: Negative.    Psychiatric/Behavioral: Negative.         Medical / Social / Family History     Past Medical History:   Diagnosis Date    Anxiety     Depression     Hyperlipidemia     Hypertension        Past Surgical History:   Procedure Laterality Date    HYSTERECTOMY         Social History  Ms. Lola Buckner  reports that she has never smoked. She has never used smokeless tobacco. She reports previous alcohol use. She reports that she does not use drugs.    Family History  Ms. Lola Buckner's family history is not on file.    Medications and Allergies     Medications  Outpatient Medications Marked as Taking for the 22 encounter  (Office Visit) with KHALIDA Mann   Medication Sig Dispense Refill    ALPRAZolam (XANAX) 0.25 MG tablet Take 0.25 mg by mouth.      atorvastatin (LIPITOR) 40 MG tablet Take 40 mg by mouth.      buPROPion (WELLBUTRIN XL) 300 MG 24 hr tablet Take 300 mg by mouth.      cholecalciferol, vitamin D3, (VITAMIN D3) 25 mcg (1,000 unit) capsule Take by mouth.      fenofibrate 160 MG Tab Take 160 mg by mouth.      lisinopriL-hydrochlorothiazide (PRINZIDE,ZESTORETIC) 10-12.5 mg per tablet Take 1 tablet by mouth.      metoprolol succinate (TOPROL-XL) 50 MG 24 hr tablet Take 50 mg by mouth.      niacin 50 MG Tab Take 50 mg by mouth.      vitamin E 1000 UNIT capsule Take 1,000 Units by mouth.         Allergies  Review of patient's allergies indicates:   Allergen Reactions    Meclizine Shortness Of Breath    Prednisone Other (See Comments), Hives and Shortness Of Breath     Hallucinations  Hallucinations      Shellfish containing products Hallucinations and Other (See Comments)       Physical Examination     Vitals:    02/08/22 0925   BP: 133/79   Pulse:    Temp:      Physical Exam  Vitals and nursing note reviewed.   Constitutional:       Appearance: She is obese.   HENT:      Head: Normocephalic and atraumatic.      Nose: Rhinorrhea present.   Cardiovascular:      Rate and Rhythm: Normal rate and regular rhythm.      Heart sounds: Normal heart sounds.   Pulmonary:      Effort: Pulmonary effort is normal.      Breath sounds: Normal breath sounds.   Musculoskeletal:      Cervical back: Normal range of motion.   Skin:     General: Skin is warm and dry.   Neurological:      Mental Status: She is alert and oriented to person, place, and time.   Psychiatric:         Behavior: Behavior normal.          Assessment and Plan (including Health Maintenance)      Problem List  Smart Sets  Document Outside HM   :    Plan:   Upper respiratory infection with cough and congestion  -     benzonatate (TESSALON) 100 MG capsule;  Take 1 capsule (100 mg total) by mouth 3 (three) times daily as needed for Cough.  Dispense: 30 capsule; Refill: 0  -     doxycycline (VIBRAMYCIN) 100 MG Cap; Take 1 capsule (100 mg total) by mouth every 12 (twelve) hours. for 10 days  Dispense: 20 capsule; Refill: 0    Cough  -     POCT SARS-COV2 (COVID) with Flu Antigen    Body aches  -     POCT SARS-COV2 (COVID) with Flu Antigen    Sore throat  -     POCT rapid strep A           Health Maintenance Due   Topic Date Due    Hepatitis C Screening  Never done    COVID-19 Vaccine (1) Never done    HIV Screening  Never done    TETANUS VACCINE  Never done    Mammogram  Never done    Colorectal Cancer Screening  Never done    Shingles Vaccine (1 of 2) Never done       Problem List Items Addressed This Visit        Pulmonary    Cough    Relevant Orders    POCT SARS-COV2 (COVID) with Flu Antigen (Completed)      Other Visit Diagnoses     Upper respiratory infection with cough and congestion    -  Primary    Relevant Medications    benzonatate (TESSALON) 100 MG capsule    doxycycline (VIBRAMYCIN) 100 MG Cap    Body aches        Relevant Orders    POCT SARS-COV2 (COVID) with Flu Antigen (Completed)    Sore throat        Relevant Orders    POCT rapid strep A (Completed)          Health Maintenance Topics with due status: Not Due       Topic Last Completion Date    Lipid Panel 11/11/2021       No future appointments.     Patient Instructions   Increase fluid intake to stay hydrated. Take Tylenol or ibuprofen for fever or pain. Go to the ER if you have difficulty breathing.     Patient Education       Cough in Adults   The Basics   Written by the doctors and editors at [facility]   What is a cough? -- A cough is an important reflex that helps clear out the body's airways (the trachea and bronchi, which are the tubes that carry air within the lungs). Coughing helps keep people from breathing things into the airways and lungs that could cause problems (figure 1).  It is  "normal for people to cough once in a while. But sometimes, a cough is a symptom of an illness or condition.  Some coughs are called "dry" coughs, because they don't bring up mucus (phlegm). Other coughs are called "wet" or "productive" coughs, because they do bring up mucus. Some coughs are mild and don't cause serious problems. Other coughs are severe and can cause trouble breathing.  What causes a cough? -- In adults, common causes of a cough include:  · Viral infections - These include the common cold, the flu, and COVID-19. Usually, cough caused by a viral infection will only last for a week or 2, but sometimes it can last for longer.  · Smoking cigarettes or vaping  · Postnasal drip - Postnasal drip is when mucus from the nose drips down or flows along the back of the throat. Postnasal drip can happen when people have:  ? A cold  ? Allergies  ? A sinus infection - The sinuses are hollow spaces in the bones of the face that open into the nose (figure 2).  · Lung conditions, like asthma and chronic obstructive pulmonary disease (COPD) - These conditions can make it hard to breathe. COPD is usually caused by smoking.  · Acid reflux - Acid reflux is when the acid that is normally in your stomach backs up into your esophagus (the tube that carries food from your mouth to your stomach).  · A side effect from blood pressure medicines called "ACE inhibitors"  Should I call my doctor or nurse? -- Call your doctor or nurse if you live in an area where people have coronavirus disease 2019 (COVID-19). They will ask you questions about your symptoms and whether you might be at risk. They can also tell you if you should get tested for the virus.  You should also call your doctor or nurse right away if:  · You have trouble breathing or noisy breathing (wheezing).  · You have a fever or chest pain.  · You cough up blood, or yellow or green mucus.  · You cough so hard that it makes you vomit.  · Your cough gets worse or lasts " "longer than 10 days.  · You have a cough and have lost weight without trying.  Will I need tests? -- Maybe. To figure out the cause of your cough, your doctor or nurse will talk with you and do an exam. Based on your symptoms and other factors, they might decide that you need tests. These might include:  · A swab from your inside your nose - This can be tested for the virus that causes COVID-19.  · A chest X-ray  · Breathing tests - Breathing tests involve breathing hard into a tube. These tests show how the lungs are working.  · Allergy skin tests to find out what you're allergic to - For a skin test, the doctor puts a drop of the substance you might be allergic to on your skin and makes a tiny prick in the skin. Then they will watch your skin to see if it gets red and bumpy.  · A CT scan of your chest or sinuses - A CT scan is an imaging test that creates pictures of the inside of the body.  · Lab tests on a sample of the mucus you cough up  · Using a "scope" to look inside your nose, sinuses, airway, or lungs  · Tests to check for acid reflux - These usually involve having a thin tube put in your mouth and down into your esophagus.  Is there anything I can do on my own to get rid of my cough? -- Yes. To help get rid of your cough, you can:  · Use a humidifier in your bedroom (if your cough is from a cold)  · Use an over-the-counter cough medicine, or suck on cough drops or hard candy  · Stop smoking, if you smoke  · If you have allergies, avoid the things you are allergic to (like pollen, dust, animals, or mold)  If you have acid reflux, your doctor or nurse will tell you which lifestyle changes can help reduce symptoms.  How is a cough treated? -- Treatment depends on the cause of your cough. For example:  · Some infections are treated with antibiotic medicines. If an infection is caused by bacteria, doctors can treat it with antibiotics. If an infection is caused by the flu virus, a different medicine might " "help. If the infection is caused by another virus (such as the common cold), antibiotics will not help.  · Postnasal drip is treated with different kinds of medicines that can come as a pill or nose spray.  · Asthma and COPD are usually treated with medicines that people breathe into their lungs (called "inhaler medicines").  · Acid reflux can be treated with medicine to reduce or block stomach acid.  · If you have a cough as a side effect from an ACE inhibitor, your doctor can switch your medicine.  If the cause of your cough is not clear, your doctor might prescribe medicine to make your cough less severe. But these medicines have side effects, and doctors usually recommend them only if nothing else has worked.  All topics are updated as new evidence becomes available and our peer review process is complete.  This topic retrieved from [employer] on: Sep 21, 2021.  Topic 50045 Version 10.0  Release: 29.4.2 - C29.263  © 2021 UpToDate, Inc. and/or its affiliates. All rights reserved.  figure 1: Normal lungs     The lungs sit in the chest, inside the ribcage. They are covered with a thin membrane called the "pleura." The windpipe, or trachea, branches into two smaller airways called the left and right "bronchi." The space between the lungs is called the "mediastinum." Lymph nodes are located within and around the lungs and mediastinum.  Graphic 54833 Version 13.0    figure 2: Sinuses of the face     This drawing shows the sinuses of the face, from the side and front views.  Graphic 222869 Version 1.0    Consumer Information Use and Disclaimer   This information is not specific medical advice and does not replace information you receive from your health care provider. This is only a brief summary of general information. It does NOT include all information about conditions, illnesses, injuries, tests, procedures, treatments, therapies, discharge instructions or life-style choices that may apply to you. You must talk with " your health care provider for complete information about your health and treatment options. This information should not be used to decide whether or not to accept your health care provider's advice, instructions or recommendations. Only your health care provider has the knowledge and training to provide advice that is right for you. The use of this information is governed by the [employer] End User License Agreement, available at https://www.[employer]/en/solutions/[employer]gage/about/yash.The use of [employer] content is governed by the [employer] Terms of Use. ©2021 UpToDate, Inc. All rights reserved.  Copyright   © 2021 UpToDate, Inc. and/or its affiliates. All rights reserved.  Patient Education       Doxycycline (edward fajardo)   Brand Names: US Acticlate; Avidoxy; Doryx; Doryx MPC; Doxy 100; Lymepak; Mondoxyne NL; Monodox [DSC]; Morgidox [DSC]; Okebo [DSC]; Oracea; Soloxide [DSC]; TargaDOX; Vibramycin   Brand Names: Ricardo APO-Doxy; APO-Doxycycline MR; Apprilon; Doxycin; Doxytab; Periostat; PMS-Doxycycline [DSC]; TEVA-Doxycycline   What is this drug used for?   · It is used to treat pimples (acne).  · It is used to treat or prevent bacterial infections.  · It is used to prevent malaria.  · It is used to treat swelling of the tissue around the teeth (periodontitis). It is used with scaling and root planing.  · It is used to treat rosacea.  · It may be given to you for other reasons. Talk with the doctor.    What do I need to tell my doctor BEFORE I take this drug?   · If you are allergic to this drug; any part of this drug; or any other drugs, foods, or substances. Tell your doctor about the allergy and what signs you had.  · If you are taking any of these drugs: Acitretin, isotretinoin, or a penicillin.  This is not a list of all drugs or health problems that interact with this drug.  Tell your doctor and pharmacist about all of your drugs (prescription or OTC, natural products, vitamins) and health problems.  You must check to make sure that it is safe for you to take this drug with all of your drugs and health problems. Do not start, stop, or change the dose of any drug without checking with your doctor.  What are some things I need to know or do while I take this drug?   · Tell all of your health care providers that you take this drug. This includes your doctors, nurses, pharmacists, and dentists.  · Have your blood work checked if you are on this drug for a long time. Talk with your doctor.  · This drug may affect certain lab tests. Tell all of your health care providers and lab workers that you take this drug.  · Do not use longer than you have been told. A second infection may happen.  · If you are allergic to sulfites, talk with your doctor. Some products have sulfites.  · This drug may make you sunburn more easily. Use care if you will be in the sun. Tell your doctor if you sunburn easily while taking this drug.  · A severe skin reaction (Barrett-Joseph syndrome/toxic epidermal necrolysis) may happen. It can cause severe health problems that may not go away, and sometimes death. Get medical help right away if you have signs like red, swollen, blistered, or peeling skin (with or without fever); red or irritated eyes; or sores in your mouth, throat, nose, or eyes.  · A severe and sometimes deadly reaction has happened. Most of the time, this reaction has signs like fever, rash, or swollen glands with problems in body organs like the liver, kidney, blood, heart, muscles and joints, or lungs. If you have questions, talk with the doctor.  · Birth control pills and other hormone-based birth control may not work as well to prevent pregnancy. Use some other kind of birth control also like a condom when taking this drug.  · This drug may change tooth color to yellow-gray brown if taken by children younger than 8 years old, or in the unborn baby if taken during some parts of pregnancy. If this change of tooth color  happens, it will not go away. Other tooth problems have also happened. Bone growth may also be affected in these people taking this drug. If you have questions, talk with the doctor.  · Most of the time, this drug is not for use in children younger than 8 years old. However, there may be times when these children may need to take this drug. Talk with the doctor.  · Change in tooth color has also happened in adults. This has gone back to normal after this drug was stopped and teeth cleaning at a dentist's office. Talk with the doctor.  · This drug may cause harm to the unborn baby if you take it while you are pregnant. If you are pregnant or you get pregnant while taking this drug, call your doctor right away.  · Tell your doctor if you are breast-feeding. You will need to talk about any risks to your baby.    What are some side effects that I need to call my doctor about right away?   WARNING/CAUTION: Even though it may be rare, some people may have very bad and sometimes deadly side effects when taking a drug. Tell your doctor or get medical help right away if you have any of the following signs or symptoms that may be related to a very bad side effect:  · Signs of an allergic reaction, like rash; hives; itching; red, swollen, blistered, or peeling skin with or without fever; wheezing; tightness in the chest or throat; trouble breathing, swallowing, or talking; unusual hoarseness; or swelling of the mouth, face, lips, tongue, or throat.  · Signs of liver problems like dark urine, feeling tired, not hungry, upset stomach or stomach pain, light-colored stools, throwing up, or yellow skin or eyes.  · Signs of a pancreas problem (pancreatitis) like very bad stomach pain, very bad back pain, or very bad upset stomach or throwing up.  · Chest pain or pressure or a fast heartbeat.  · Not able to pass urine or change in how much urine is passed.  · Fever, chills, or sore throat; any unexplained bruising or bleeding; or  feeling very tired or weak.  · Throat irritation.  · Trouble swallowing.  · Muscle or joint pain.  · Fast breathing.  · Flushing.  · Very bad dizziness or passing out.  · Change in skin color.  · Swollen gland.  · Vaginal itching or discharge.  · Diarrhea is common with antibiotics. Rarely, a severe form called C diff-associated diarrhea (CDAD) may happen. Sometimes, this has led to a deadly bowel problem. CDAD may happen during or a few months after taking antibiotics. Call your doctor right away if you have stomach pain, cramps, or very loose, watery, or bloody stools. Check with your doctor before treating diarrhea.  · Raised pressure in the brain has happened with this drug. Most of the time, this will go back to normal after this drug is stopped. Sometimes, loss of eyesight may happen and may not go away even after this drug is stopped. Call your doctor right away if you have a headache or eyesight problems like blurred eyesight, seeing double, or loss of eyesight.  What are some other side effects of this drug?   All drugs may cause side effects. However, many people have no side effects or only have minor side effects. Call your doctor or get medical help if any of these side effects or any other side effects bother you or do not go away:  · Diarrhea, upset stomach, or throwing up.  · Not hungry.  These are not all of the side effects that may occur. If you have questions about side effects, call your doctor. Call your doctor for medical advice about side effects.  You may report side effects to your national health agency.  You may report side effects to the FDA at 1-521.410.4910. You may also report side effects at https://www.fda.gov/medwatch.  How is this drug best taken?   Use this drug as ordered by your doctor. Read all information given to you. Follow all instructions closely.  All oral products:   · Keep taking this drug as you have been told by your doctor or other health care provider, even if you  feel well.  · Some drugs may need to be taken with food or on an empty stomach. For some drugs it does not matter. Check with your pharmacist about how to take this drug.  · It is best to avoid taking this drug at the same time as milk, dairy, or other products with calcium. This drug may not work as well. If you have questions, talk with the doctor or pharmacist.  · Drink lots of noncaffeine liquids unless told to drink less liquid by your doctor.  · Do not take bismuth (Pepto-Bismol®), calcium, iron, magnesium, zinc, multivitamins with minerals, colestipol, cholestyramine, didanosine, or antacids within 2 hours of this drug.  Tablets and capsules:   · Take with a full glass of water.  · Do not lie down after taking this drug. This will help lower the chance of esophagus irritation. Ask your pharmacist how long before you can lie down after taking this drug.  Delayed-release tablets:   · Swallow whole. Do not chew or crush.  · The tablet may be broken if the doctor tells you to.  · You may sprinkle contents of tablet on applesauce. Be careful to break the tablet without crushing the pellets. Do not chew, crush, or damage the contents of the tablet.  · Do not mix with hot applesauce.  · If mixed, swallow the mixed drug right away. Do not store for use at a later time.  All liquid products:   · Measure liquid doses carefully. Use the measuring device that comes with this drug. If there is none, ask the pharmacist for a device to measure this drug.  Liquid (suspension):   · Shake well before use.  Injection:   · It is given as an infusion into a vein over a period of time.  What do I do if I miss a dose?   All oral products:   · Take a missed dose as soon as you think about it.  · If it is close to the time for your next dose, skip the missed dose and go back to your normal time.  · Do not take 2 doses at the same time or extra doses.  Injection:   · Call your doctor to find out what to do.  How do I store and/or  throw out this drug?   All oral products:   · Store at room temperature protected from light. Store in a dry place. Do not store in a bathroom.  · Do not take this drug if it is outdated.  · Do not take this drug if it has not been stored as you have been told.  Liquid (syrup):   · Throw away any unused part of this drug.  Liquid (suspension):   · Store liquid (suspension) at room temperature. Throw away any part not used after 2 weeks.  Injection:   · If you need to store this drug at home, talk with your doctor, nurse, or pharmacist about how to store it.  All products:   · Keep all drugs in a safe place. Keep all drugs out of the reach of children and pets.  · Throw away unused or  drugs. Do not flush down a toilet or pour down a drain unless you are told to do so. Check with your pharmacist if you have questions about the best way to throw out drugs. There may be drug take-back programs in your area.  General drug facts   · If your symptoms or health problems do not get better or if they become worse, call your doctor.  · Do not share your drugs with others and do not take anyone else's drugs.  · Some drugs may have another patient information leaflet. If you have any questions about this drug, please talk with your doctor, nurse, pharmacist, or other health care provider.  · Some drugs may have another patient information leaflet. Check with your pharmacist. If you have any questions about this drug, please talk with your doctor, nurse, pharmacist, or other health care provider.  · If you think there has been an overdose, call your poison control center or get medical care right away. Be ready to tell or show what was taken, how much, and when it happened.    Consumer Information Use and Disclaimer   This generalized information is a limited summary of diagnosis, treatment, and/or medication information. It is not meant to be comprehensive and should be used as a tool to help the user understand and/or  assess potential diagnostic and treatment options. It does NOT include all information about conditions, treatments, medications, side effects, or risks that may apply to a specific patient. It is not intended to be medical advice or a substitute for the medical advice, diagnosis, or treatment of a health care provider based on the health care provider's examination and assessment of a patient's specific and unique circumstances. Patients must speak with a health care provider for complete information about their health, medical questions, and treatment options, including any risks or benefits regarding use of medications. This information does not endorse any treatments or medications as safe, effective, or approved for treating a specific patient. UpToDate, Inc. and its affiliates disclaim any warranty or liability relating to this information or the use thereof. The use of this information is governed by the Terms of Use, available at https://www.[employer].RotaryView/en/solutions/lexicomp/about/yash.  Last Reviewed Date   2020-03-18  Copyright   © 2021 UpToDate, Inc. and its affiliates and/or licensors. All rights reserved.      Follow up in about 3 months (around 5/8/2022), or if symptoms worsen or fail to improve.     Signature:  KHALIDA Mann      Date of encounter: 2/8/22

## 2022-02-11 NOTE — PATIENT INSTRUCTIONS
"Increase fluid intake to stay hydrated. Take Tylenol or ibuprofen for fever or pain. Go to the ER if you have difficulty breathing.     Patient Education       Cough in Adults   The Basics   Written by the doctors and editors at [facility]   What is a cough? -- A cough is an important reflex that helps clear out the body's airways (the trachea and bronchi, which are the tubes that carry air within the lungs). Coughing helps keep people from breathing things into the airways and lungs that could cause problems (figure 1).  It is normal for people to cough once in a while. But sometimes, a cough is a symptom of an illness or condition.  Some coughs are called "dry" coughs, because they don't bring up mucus (phlegm). Other coughs are called "wet" or "productive" coughs, because they do bring up mucus. Some coughs are mild and don't cause serious problems. Other coughs are severe and can cause trouble breathing.  What causes a cough? -- In adults, common causes of a cough include:  · Viral infections - These include the common cold, the flu, and COVID-19. Usually, cough caused by a viral infection will only last for a week or 2, but sometimes it can last for longer.  · Smoking cigarettes or vaping  · Postnasal drip - Postnasal drip is when mucus from the nose drips down or flows along the back of the throat. Postnasal drip can happen when people have:  ? A cold  ? Allergies  ? A sinus infection - The sinuses are hollow spaces in the bones of the face that open into the nose (figure 2).  · Lung conditions, like asthma and chronic obstructive pulmonary disease (COPD) - These conditions can make it hard to breathe. COPD is usually caused by smoking.  · Acid reflux - Acid reflux is when the acid that is normally in your stomach backs up into your esophagus (the tube that carries food from your mouth to your stomach).  · A side effect from blood pressure medicines called "ACE inhibitors"  Should I call my doctor or " "nurse? -- Call your doctor or nurse if you live in an area where people have coronavirus disease 2019 (COVID-19). They will ask you questions about your symptoms and whether you might be at risk. They can also tell you if you should get tested for the virus.  You should also call your doctor or nurse right away if:  · You have trouble breathing or noisy breathing (wheezing).  · You have a fever or chest pain.  · You cough up blood, or yellow or green mucus.  · You cough so hard that it makes you vomit.  · Your cough gets worse or lasts longer than 10 days.  · You have a cough and have lost weight without trying.  Will I need tests? -- Maybe. To figure out the cause of your cough, your doctor or nurse will talk with you and do an exam. Based on your symptoms and other factors, they might decide that you need tests. These might include:  · A swab from your inside your nose - This can be tested for the virus that causes COVID-19.  · A chest X-ray  · Breathing tests - Breathing tests involve breathing hard into a tube. These tests show how the lungs are working.  · Allergy skin tests to find out what you're allergic to - For a skin test, the doctor puts a drop of the substance you might be allergic to on your skin and makes a tiny prick in the skin. Then they will watch your skin to see if it gets red and bumpy.  · A CT scan of your chest or sinuses - A CT scan is an imaging test that creates pictures of the inside of the body.  · Lab tests on a sample of the mucus you cough up  · Using a "scope" to look inside your nose, sinuses, airway, or lungs  · Tests to check for acid reflux - These usually involve having a thin tube put in your mouth and down into your esophagus.  Is there anything I can do on my own to get rid of my cough? -- Yes. To help get rid of your cough, you can:  · Use a humidifier in your bedroom (if your cough is from a cold)  · Use an over-the-counter cough medicine, or suck on cough drops or hard " "candy  · Stop smoking, if you smoke  · If you have allergies, avoid the things you are allergic to (like pollen, dust, animals, or mold)  If you have acid reflux, your doctor or nurse will tell you which lifestyle changes can help reduce symptoms.  How is a cough treated? -- Treatment depends on the cause of your cough. For example:  · Some infections are treated with antibiotic medicines. If an infection is caused by bacteria, doctors can treat it with antibiotics. If an infection is caused by the flu virus, a different medicine might help. If the infection is caused by another virus (such as the common cold), antibiotics will not help.  · Postnasal drip is treated with different kinds of medicines that can come as a pill or nose spray.  · Asthma and COPD are usually treated with medicines that people breathe into their lungs (called "inhaler medicines").  · Acid reflux can be treated with medicine to reduce or block stomach acid.  · If you have a cough as a side effect from an ACE inhibitor, your doctor can switch your medicine.  If the cause of your cough is not clear, your doctor might prescribe medicine to make your cough less severe. But these medicines have side effects, and doctors usually recommend them only if nothing else has worked.  All topics are updated as new evidence becomes available and our peer review process is complete.  This topic retrieved from [employer] on: Sep 21, 2021.  Topic 46658 Version 10.0  Release: 29.4.2 - C29.263  © 2021 UpToDate, Inc. and/or its affiliates. All rights reserved.  figure 1: Normal lungs     The lungs sit in the chest, inside the ribcage. They are covered with a thin membrane called the "pleura." The windpipe, or trachea, branches into two smaller airways called the left and right "bronchi." The space between the lungs is called the "mediastinum." Lymph nodes are located within and around the lungs and mediastinum.  Graphic 17749 Version 13.0    figure 2: Sinuses of " the face     This drawing shows the sinuses of the face, from the side and front views.  Graphic 213104 Version 1.0    Consumer Information Use and Disclaimer   This information is not specific medical advice and does not replace information you receive from your health care provider. This is only a brief summary of general information. It does NOT include all information about conditions, illnesses, injuries, tests, procedures, treatments, therapies, discharge instructions or life-style choices that may apply to you. You must talk with your health care provider for complete information about your health and treatment options. This information should not be used to decide whether or not to accept your health care provider's advice, instructions or recommendations. Only your health care provider has the knowledge and training to provide advice that is right for you. The use of this information is governed by the [employer] End User License Agreement, available at https://www.[employer]/en/solutions/KIHEITAI/about/yash.The use of [employer] content is governed by the [employer] Terms of Use. ©2021 UpToDate, Inc. All rights reserved.  Copyright   © 2021 UpToDate, Inc. and/or its affiliates. All rights reserved.  Patient Education       Doxycycline (edward fajardo)   Brand Names: US Acticlate; Avidoxy; Doryx; Doryx MPC; Doxy 100; Lymepak; Mondoxyne NL; Monodox [DSC]; Morgidox [DSC]; Okebo [DSC]; Oracea; Soloxide [DSC]; TargaDOX; Vibramycin   Brand Names: Ricardo APO-Doxy; APO-Doxycycline MR; Apprilon; Doxycin; Doxytab; Periostat; PMS-Doxycycline [DSC]; TEVA-Doxycycline   What is this drug used for?   · It is used to treat pimples (acne).  · It is used to treat or prevent bacterial infections.  · It is used to prevent malaria.  · It is used to treat swelling of the tissue around the teeth (periodontitis). It is used with scaling and root planing.  · It is used to treat rosacea.  · It may be given to you for other  reasons. Talk with the doctor.    What do I need to tell my doctor BEFORE I take this drug?   · If you are allergic to this drug; any part of this drug; or any other drugs, foods, or substances. Tell your doctor about the allergy and what signs you had.  · If you are taking any of these drugs: Acitretin, isotretinoin, or a penicillin.  This is not a list of all drugs or health problems that interact with this drug.  Tell your doctor and pharmacist about all of your drugs (prescription or OTC, natural products, vitamins) and health problems. You must check to make sure that it is safe for you to take this drug with all of your drugs and health problems. Do not start, stop, or change the dose of any drug without checking with your doctor.  What are some things I need to know or do while I take this drug?   · Tell all of your health care providers that you take this drug. This includes your doctors, nurses, pharmacists, and dentists.  · Have your blood work checked if you are on this drug for a long time. Talk with your doctor.  · This drug may affect certain lab tests. Tell all of your health care providers and lab workers that you take this drug.  · Do not use longer than you have been told. A second infection may happen.  · If you are allergic to sulfites, talk with your doctor. Some products have sulfites.  · This drug may make you sunburn more easily. Use care if you will be in the sun. Tell your doctor if you sunburn easily while taking this drug.  · A severe skin reaction (Barrett-Joseph syndrome/toxic epidermal necrolysis) may happen. It can cause severe health problems that may not go away, and sometimes death. Get medical help right away if you have signs like red, swollen, blistered, or peeling skin (with or without fever); red or irritated eyes; or sores in your mouth, throat, nose, or eyes.  · A severe and sometimes deadly reaction has happened. Most of the time, this reaction has signs like fever, rash,  or swollen glands with problems in body organs like the liver, kidney, blood, heart, muscles and joints, or lungs. If you have questions, talk with the doctor.  · Birth control pills and other hormone-based birth control may not work as well to prevent pregnancy. Use some other kind of birth control also like a condom when taking this drug.  · This drug may change tooth color to yellow-gray brown if taken by children younger than 8 years old, or in the unborn baby if taken during some parts of pregnancy. If this change of tooth color happens, it will not go away. Other tooth problems have also happened. Bone growth may also be affected in these people taking this drug. If you have questions, talk with the doctor.  · Most of the time, this drug is not for use in children younger than 8 years old. However, there may be times when these children may need to take this drug. Talk with the doctor.  · Change in tooth color has also happened in adults. This has gone back to normal after this drug was stopped and teeth cleaning at a dentist's office. Talk with the doctor.  · This drug may cause harm to the unborn baby if you take it while you are pregnant. If you are pregnant or you get pregnant while taking this drug, call your doctor right away.  · Tell your doctor if you are breast-feeding. You will need to talk about any risks to your baby.    What are some side effects that I need to call my doctor about right away?   WARNING/CAUTION: Even though it may be rare, some people may have very bad and sometimes deadly side effects when taking a drug. Tell your doctor or get medical help right away if you have any of the following signs or symptoms that may be related to a very bad side effect:  · Signs of an allergic reaction, like rash; hives; itching; red, swollen, blistered, or peeling skin with or without fever; wheezing; tightness in the chest or throat; trouble breathing, swallowing, or talking; unusual hoarseness; or  swelling of the mouth, face, lips, tongue, or throat.  · Signs of liver problems like dark urine, feeling tired, not hungry, upset stomach or stomach pain, light-colored stools, throwing up, or yellow skin or eyes.  · Signs of a pancreas problem (pancreatitis) like very bad stomach pain, very bad back pain, or very bad upset stomach or throwing up.  · Chest pain or pressure or a fast heartbeat.  · Not able to pass urine or change in how much urine is passed.  · Fever, chills, or sore throat; any unexplained bruising or bleeding; or feeling very tired or weak.  · Throat irritation.  · Trouble swallowing.  · Muscle or joint pain.  · Fast breathing.  · Flushing.  · Very bad dizziness or passing out.  · Change in skin color.  · Swollen gland.  · Vaginal itching or discharge.  · Diarrhea is common with antibiotics. Rarely, a severe form called C diff-associated diarrhea (CDAD) may happen. Sometimes, this has led to a deadly bowel problem. CDAD may happen during or a few months after taking antibiotics. Call your doctor right away if you have stomach pain, cramps, or very loose, watery, or bloody stools. Check with your doctor before treating diarrhea.  · Raised pressure in the brain has happened with this drug. Most of the time, this will go back to normal after this drug is stopped. Sometimes, loss of eyesight may happen and may not go away even after this drug is stopped. Call your doctor right away if you have a headache or eyesight problems like blurred eyesight, seeing double, or loss of eyesight.  What are some other side effects of this drug?   All drugs may cause side effects. However, many people have no side effects or only have minor side effects. Call your doctor or get medical help if any of these side effects or any other side effects bother you or do not go away:  · Diarrhea, upset stomach, or throwing up.  · Not hungry.  These are not all of the side effects that may occur. If you have questions about  side effects, call your doctor. Call your doctor for medical advice about side effects.  You may report side effects to your national health agency.  You may report side effects to the FDA at 1-168.877.7720. You may also report side effects at https://www.fda.gov/medwatch.  How is this drug best taken?   Use this drug as ordered by your doctor. Read all information given to you. Follow all instructions closely.  All oral products:   · Keep taking this drug as you have been told by your doctor or other health care provider, even if you feel well.  · Some drugs may need to be taken with food or on an empty stomach. For some drugs it does not matter. Check with your pharmacist about how to take this drug.  · It is best to avoid taking this drug at the same time as milk, dairy, or other products with calcium. This drug may not work as well. If you have questions, talk with the doctor or pharmacist.  · Drink lots of noncaffeine liquids unless told to drink less liquid by your doctor.  · Do not take bismuth (Pepto-Bismol®), calcium, iron, magnesium, zinc, multivitamins with minerals, colestipol, cholestyramine, didanosine, or antacids within 2 hours of this drug.  Tablets and capsules:   · Take with a full glass of water.  · Do not lie down after taking this drug. This will help lower the chance of esophagus irritation. Ask your pharmacist how long before you can lie down after taking this drug.  Delayed-release tablets:   · Swallow whole. Do not chew or crush.  · The tablet may be broken if the doctor tells you to.  · You may sprinkle contents of tablet on applesauce. Be careful to break the tablet without crushing the pellets. Do not chew, crush, or damage the contents of the tablet.  · Do not mix with hot applesauce.  · If mixed, swallow the mixed drug right away. Do not store for use at a later time.  All liquid products:   · Measure liquid doses carefully. Use the measuring device that comes with this drug. If there  is none, ask the pharmacist for a device to measure this drug.  Liquid (suspension):   · Shake well before use.  Injection:   · It is given as an infusion into a vein over a period of time.  What do I do if I miss a dose?   All oral products:   · Take a missed dose as soon as you think about it.  · If it is close to the time for your next dose, skip the missed dose and go back to your normal time.  · Do not take 2 doses at the same time or extra doses.  Injection:   · Call your doctor to find out what to do.  How do I store and/or throw out this drug?   All oral products:   · Store at room temperature protected from light. Store in a dry place. Do not store in a bathroom.  · Do not take this drug if it is outdated.  · Do not take this drug if it has not been stored as you have been told.  Liquid (syrup):   · Throw away any unused part of this drug.  Liquid (suspension):   · Store liquid (suspension) at room temperature. Throw away any part not used after 2 weeks.  Injection:   · If you need to store this drug at home, talk with your doctor, nurse, or pharmacist about how to store it.  All products:   · Keep all drugs in a safe place. Keep all drugs out of the reach of children and pets.  · Throw away unused or  drugs. Do not flush down a toilet or pour down a drain unless you are told to do so. Check with your pharmacist if you have questions about the best way to throw out drugs. There may be drug take-back programs in your area.  General drug facts   · If your symptoms or health problems do not get better or if they become worse, call your doctor.  · Do not share your drugs with others and do not take anyone else's drugs.  · Some drugs may have another patient information leaflet. If you have any questions about this drug, please talk with your doctor, nurse, pharmacist, or other health care provider.  · Some drugs may have another patient information leaflet. Check with your pharmacist. If you have any  questions about this drug, please talk with your doctor, nurse, pharmacist, or other health care provider.  · If you think there has been an overdose, call your poison control center or get medical care right away. Be ready to tell or show what was taken, how much, and when it happened.    Consumer Information Use and Disclaimer   This generalized information is a limited summary of diagnosis, treatment, and/or medication information. It is not meant to be comprehensive and should be used as a tool to help the user understand and/or assess potential diagnostic and treatment options. It does NOT include all information about conditions, treatments, medications, side effects, or risks that may apply to a specific patient. It is not intended to be medical advice or a substitute for the medical advice, diagnosis, or treatment of a health care provider based on the health care provider's examination and assessment of a patient's specific and unique circumstances. Patients must speak with a health care provider for complete information about their health, medical questions, and treatment options, including any risks or benefits regarding use of medications. This information does not endorse any treatments or medications as safe, effective, or approved for treating a specific patient. UpToDate, Inc. and its affiliates disclaim any warranty or liability relating to this information or the use thereof. The use of this information is governed by the Terms of Use, available at https://www.TORCH.sh.com/en/solutions/lexicomp/about/yash.  Last Reviewed Date   2020-03-18  Copyright   © 2021 UpToDate, Inc. and its affiliates and/or licensors. All rights reserved.

## 2022-04-28 ENCOUNTER — HOSPITAL ENCOUNTER (EMERGENCY)
Facility: HOSPITAL | Age: 62
Discharge: HOME | End: 2022-04-28
Payer: COMMERCIAL

## 2022-04-28 VITALS
BODY MASS INDEX: 30.32 KG/M2 | TEMPERATURE: 98 F | DIASTOLIC BLOOD PRESSURE: 81 MMHG | HEART RATE: 77 BPM | WEIGHT: 182 LBS | OXYGEN SATURATION: 94 % | RESPIRATION RATE: 16 BRPM | SYSTOLIC BLOOD PRESSURE: 136 MMHG | HEIGHT: 65 IN

## 2022-04-28 DIAGNOSIS — R51.9 FRONTAL HEADACHE: Primary | ICD-10-CM

## 2022-04-28 PROCEDURE — 99283 EMERGENCY DEPT VISIT LOW MDM: CPT | Mod: ,,, | Performed by: NURSE PRACTITIONER

## 2022-04-28 PROCEDURE — 96375 TX/PRO/DX INJ NEW DRUG ADDON: CPT

## 2022-04-28 PROCEDURE — 99283 PR EMERGENCY DEPT VISIT,LEVEL III: ICD-10-PCS | Mod: ,,, | Performed by: NURSE PRACTITIONER

## 2022-04-28 PROCEDURE — 99284 EMERGENCY DEPT VISIT MOD MDM: CPT | Mod: 25

## 2022-04-28 PROCEDURE — 63600175 PHARM REV CODE 636 W HCPCS: Performed by: NURSE PRACTITIONER

## 2022-04-28 PROCEDURE — 96374 THER/PROPH/DIAG INJ IV PUSH: CPT

## 2022-04-28 RX ORDER — DIPHENHYDRAMINE HYDROCHLORIDE 50 MG/ML
25 INJECTION INTRAMUSCULAR; INTRAVENOUS
Status: COMPLETED | OUTPATIENT
Start: 2022-04-28 | End: 2022-04-28

## 2022-04-28 RX ORDER — PROCHLORPERAZINE EDISYLATE 5 MG/ML
10 INJECTION INTRAMUSCULAR; INTRAVENOUS
Status: COMPLETED | OUTPATIENT
Start: 2022-04-28 | End: 2022-04-28

## 2022-04-28 RX ORDER — KETOROLAC TROMETHAMINE 30 MG/ML
15 INJECTION, SOLUTION INTRAMUSCULAR; INTRAVENOUS
Status: COMPLETED | OUTPATIENT
Start: 2022-04-28 | End: 2022-04-28

## 2022-04-28 RX ADMIN — PROCHLORPERAZINE EDISYLATE 10 MG: 5 INJECTION, SOLUTION INTRAMUSCULAR; INTRAVENOUS at 08:04

## 2022-04-28 RX ADMIN — DIPHENHYDRAMINE HYDROCHLORIDE 25 MG: 50 INJECTION, SOLUTION INTRAMUSCULAR; INTRAVENOUS at 08:04

## 2022-04-28 RX ADMIN — KETOROLAC TROMETHAMINE 15 MG: 30 INJECTION, SOLUTION INTRAMUSCULAR at 07:04

## 2022-04-29 ENCOUNTER — OFFICE VISIT (OUTPATIENT)
Dept: FAMILY MEDICINE | Facility: CLINIC | Age: 62
End: 2022-04-29
Payer: COMMERCIAL

## 2022-04-29 VITALS
SYSTOLIC BLOOD PRESSURE: 156 MMHG | TEMPERATURE: 99 F | WEIGHT: 182.38 LBS | DIASTOLIC BLOOD PRESSURE: 94 MMHG | OXYGEN SATURATION: 96 % | BODY MASS INDEX: 30.39 KG/M2 | RESPIRATION RATE: 20 BRPM | HEIGHT: 65 IN | HEART RATE: 84 BPM

## 2022-04-29 DIAGNOSIS — G43.019 INTRACTABLE MIGRAINE WITHOUT AURA AND WITHOUT STATUS MIGRAINOSUS: Primary | ICD-10-CM

## 2022-04-29 PROCEDURE — 3080F DIAST BP >= 90 MM HG: CPT | Mod: ,,, | Performed by: REGISTERED NURSE

## 2022-04-29 PROCEDURE — 99214 OFFICE O/P EST MOD 30 MIN: CPT | Mod: ,,, | Performed by: REGISTERED NURSE

## 2022-04-29 PROCEDURE — 3077F PR MOST RECENT SYSTOLIC BLOOD PRESSURE >= 140 MM HG: ICD-10-PCS | Mod: ,,, | Performed by: REGISTERED NURSE

## 2022-04-29 PROCEDURE — 1160F RVW MEDS BY RX/DR IN RCRD: CPT | Mod: ,,, | Performed by: REGISTERED NURSE

## 2022-04-29 PROCEDURE — 1159F PR MEDICATION LIST DOCUMENTED IN MEDICAL RECORD: ICD-10-PCS | Mod: ,,, | Performed by: REGISTERED NURSE

## 2022-04-29 PROCEDURE — 3008F BODY MASS INDEX DOCD: CPT | Mod: ,,, | Performed by: REGISTERED NURSE

## 2022-04-29 PROCEDURE — 3080F PR MOST RECENT DIASTOLIC BLOOD PRESSURE >= 90 MM HG: ICD-10-PCS | Mod: ,,, | Performed by: REGISTERED NURSE

## 2022-04-29 PROCEDURE — 3077F SYST BP >= 140 MM HG: CPT | Mod: ,,, | Performed by: REGISTERED NURSE

## 2022-04-29 PROCEDURE — 4010F PR ACE/ARB THEARPY RXD/TAKEN: ICD-10-PCS | Mod: ,,, | Performed by: REGISTERED NURSE

## 2022-04-29 PROCEDURE — 99214 PR OFFICE/OUTPT VISIT, EST, LEVL IV, 30-39 MIN: ICD-10-PCS | Mod: ,,, | Performed by: REGISTERED NURSE

## 2022-04-29 PROCEDURE — 3008F PR BODY MASS INDEX (BMI) DOCUMENTED: ICD-10-PCS | Mod: ,,, | Performed by: REGISTERED NURSE

## 2022-04-29 PROCEDURE — 4010F ACE/ARB THERAPY RXD/TAKEN: CPT | Mod: ,,, | Performed by: REGISTERED NURSE

## 2022-04-29 PROCEDURE — 1159F MED LIST DOCD IN RCRD: CPT | Mod: ,,, | Performed by: REGISTERED NURSE

## 2022-04-29 PROCEDURE — 1160F PR REVIEW ALL MEDS BY PRESCRIBER/CLIN PHARMACIST DOCUMENTED: ICD-10-PCS | Mod: ,,, | Performed by: REGISTERED NURSE

## 2022-04-29 RX ORDER — PROMETHAZINE HYDROCHLORIDE 25 MG/1
TABLET ORAL
Qty: 5 TABLET | Refills: 0 | Status: SHIPPED | OUTPATIENT
Start: 2022-04-29 | End: 2023-01-03 | Stop reason: ALTCHOICE

## 2022-04-29 RX ORDER — TIZANIDINE 2 MG/1
TABLET ORAL
Qty: 10 TABLET | Refills: 0 | Status: SHIPPED | OUTPATIENT
Start: 2022-04-29 | End: 2023-01-03 | Stop reason: ALTCHOICE

## 2022-04-29 NOTE — ED TRIAGE NOTES
Patient came in c/o headache since Sunday. Patient denies any deficits. Patient denies any trauma.  Patient states she has not seen her provider that she thought it would go away on its own.  Patient reports nausea as well. Patient denies any vomiting. Patient said tonight she decided to come in because she was tired of it hurting.  Patient's daughter brought her in and is present and at bedside.

## 2022-04-29 NOTE — PROGRESS NOTES
KHALIDA Mann        PATIENT NAME: Lola Buckner  : 1960  DATE: 22  MRN: [id number]      Billing Provider: KHALIDA Mann  Level of Service: [facility] OFFICE/[facility]PT VISIT, EST, LEVL IV, 30-39 MIN  Patient PCP Information     Provider PCP Type    Primary Doctor No General          Reason for Visit / Chief Complaint: Migraine and Nausea       Update PCP  Update Chief Complaint         History of Present Illness / Problem Focused Workflow      HPI Mrs. Buckner is here today with complaints of migraine. Pain started 4 days ago and has gotten progressively worse. She has sensitivity to light, dizziness, and nausea. She denies visual disturbance or fainting. History of migraines reported but patient states she does not have them frequently and has not had one in several months until now.     Review of Systems     Review of Systems   Constitutional: Positive for activity change. Negative for unexpected weight change.   HENT: Negative for hearing loss, rhinorrhea and trouble swallowing.    Eyes: Negative for discharge and visual disturbance.   Respiratory: Positive for chest tightness. Negative for cough, shortness of breath and wheezing.    Cardiovascular: Negative for palpitations.   Gastrointestinal: Negative for blood in stool, constipation, diarrhea and vomiting.   Endocrine: Negative for polydipsia and polyuria.   Genitourinary: Negative for difficulty urinating, dysuria, hematuria and menstrual problem.   Musculoskeletal: Positive for neck pain. Negative for arthralgias and joint swelling.   Neurological: Positive for weakness and headaches.   Psychiatric/Behavioral: Negative for confusion and dysphoric mood.        Medical / Social / Family History     Past Medical History:   Diagnosis Date    Anxiety     Depression     Hyperlipidemia     Hypertension        Past Surgical History:   Procedure Laterality Date    HYSTERECTOMY         Social History  Ms. Lola Buckner  reports that she  has never smoked. She has never used smokeless tobacco. She reports previous alcohol use. She reports that she does not use drugs.    Family History  Ms. Lola Buckner's family history is not on file.    Medications and Allergies     Medications  Outpatient Medications Marked as Taking for the 4/29/22 encounter (Office Visit) with KHALIDA Mann   Medication Sig Dispense Refill    ALPRAZolam (XANAX) 0.25 MG tablet Take 0.25 mg by mouth.      atorvastatin (LIPITOR) 40 MG tablet Take 40 mg by mouth.      buPROPion (WELLBUTRIN XL) 300 MG 24 hr tablet Take 300 mg by mouth.      fenofibrate 160 MG Tab Take 160 mg by mouth.      lisinopriL-hydrochlorothiazide (PRINZIDE,ZESTORETIC) 10-12.5 mg per tablet Take 1 tablet by mouth.      metoprolol succinate (TOPROL-XL) 50 MG 24 hr tablet Take 50 mg by mouth.         Allergies  Review of patient's allergies indicates:   Allergen Reactions    Meclizine Shortness Of Breath    Prednisone Other (See Comments), Hives and Shortness Of Breath     Hallucinations  Hallucinations      Shellfish containing products Hallucinations and Other (See Comments)       Physical Examination     Vitals:    04/29/22 1425   BP: (!) 156/94   Pulse: 84   Resp: 20   Temp: 98.8 °F (37.1 °C)     Physical Exam  Vitals and nursing note reviewed.   Constitutional:       Appearance: She is ill-appearing.   HENT:      Head: Normocephalic and atraumatic.      Nose: Nose normal.   Neck:      Comments: Neck pain and stiffness started at time of headache. No swelling, no tenderness to palpation. Patient states neck feels tight and she feels pulling and muscle tightness with movement.   Cardiovascular:      Rate and Rhythm: Normal rate and regular rhythm.      Heart sounds: Normal heart sounds.   Pulmonary:      Effort: Pulmonary effort is normal.      Breath sounds: Normal breath sounds.   Musculoskeletal:         General: Normal range of motion.   Skin:     General: Skin is warm and dry.    Neurological:      Mental Status: She is alert and oriented to person, place, and time.   Psychiatric:         Behavior: Behavior normal.       Assessment and Plan (including Health Maintenance)      Problem List  Smart Sets  Document Outside HM   Plan:   Intractable migraine without aura and without status migrainosus  -     promethazine (PHENERGAN) 25 MG tablet; 1/2-1 tablet every 6-8 hours as needed for nausea.  Dispense: 5 tablet; Refill: 0  -     tiZANidine (ZANAFLEX) 2 MG tablet; Take 1-2 tablets as needed for neck stiffness  Dispense: 10 tablet; Refill: 0       Health Maintenance Due   Topic Date Due    Hepatitis C Screening  Never done    COVID-19 Vaccine (1) Never done    HIV Screening  Never done    TETANUS VACCINE  Never done    Mammogram  Never done    Colorectal Cancer Screening  Never done    Shingles Vaccine (1 of 2) Never done       Problem List Items Addressed This Visit    None     Visit Diagnoses     Intractable migraine without aura and without status migrainosus    -  Primary    Relevant Medications    promethazine (PHENERGAN) 25 MG tablet    tiZANidine (ZANAFLEX) 2 MG tablet          Health Maintenance Topics with due status: Not Due       Topic Last Completion Date    Lipid Panel 04/21/2022       No future appointments.     Patient Instructions   Medication is a proven way to both treat and prevent migraines. But medication is only part of the story. It's also important to take good care of yourself and understand how to cope with migraine pain when it strikes.    The same lifestyle choices that promote overall good health can also reduce the frequency and severity of your migraines. Combining medication with behavioral measures and lifestyle can often be the most effective way to handle migraines.    At the first sign of a migraine, take a break and step away from whatever you're doing if possible. Turn off the lights. Migraines often increase sensitivity to light and sound. Relax in  a dark, quiet room. Sleep if you can.  Try temperature therapy. Apply hot or cold compresses to your head or neck. Ice packs have a numbing effect, which may dull the sensation of pain. Hot packs and heating pads can relax tense muscles. Warm showers or baths may have a similar effect.  Drink a caffeinated beverage. In small amounts, caffeine alone can relieve migraine pain in the early stages or enhance the pain-reducing effects of acetaminophen (Tylenol, others) and aspirin.  Drinking too much caffeine too often can lead to withdrawal headaches later on. And having caffeine too late in the day may interfere with your sleep, which can also affect migraines.    Migraines may keep you from falling asleep or wake you up at night. Likewise, migraines are often triggered by a poor night's sleep. Establish regular sleep hours. Wake up and go to bed at the same time every day -- even on weekends. If you nap during the day, keep it short. Naps longer than 20 to 30 minutes may interfere with nighttime sleep.  Unwind at the end of the day. Anything that helps you relax can promote better sleep: listen to soothing music, soak in a warm bath or read a favorite book.    Watch what you eat and drink before bedtime. Intense exercise, heavy meals, caffeine, nicotine and alcohol can interfere with sleep.    Minimize distractions. Save your bedroom for sleep and intimacy. Don't watch television or take work materials to bed. Close your bedroom door. Use a fan to muffle distracting noises.  Don't try so hard to sleep. The harder you try to sleep, the more awake you'll feel. If you can't fall asleep, read or do another quiet activity until you become drowsy.    Check your medications. Medications that contain caffeine or other stimulants -- including some medications to treat migraines -- may interfere with sleep.    Your eating habits can influence your migraines. Consider the basics:  Be consistent. Eat at about the same time every  day.  Don't skip meals. Fasting increases the risk of migraines.  Keep a food journal. Keeping track of the foods you eat and when you experience migraines can help identify potential food triggers.  Avoid foods that trigger migraines. If you suspect that a certain food -- such as aged cheese, chocolate, caffeine or alcohol -- is triggering your migraines, eliminate it from your diet to see what happens.    Follow up in about 3 months (around 7/29/2022), or if symptoms worsen or fail to improve.     Signature:  KHALIDA Mann      Date of encounter: 4/29/22

## 2022-04-29 NOTE — ED PROVIDER NOTES
Encounter Date: 4/28/2022       History     Chief Complaint   Patient presents with    Headache     61 y/o WF presents pov with c/o headache x 4 days with associated nausea. Location is bilateral frontal lobe. Taking Tylenol with no relief. No h/o migraine. Denies sinus congest, cough, or fever. Wears glasses but no recent adjustment.        Review of patient's allergies indicates:   Allergen Reactions    Meclizine Shortness Of Breath    Prednisone Other (See Comments), Hives and Shortness Of Breath     Hallucinations  Hallucinations      Shellfish containing products Hallucinations and Other (See Comments)     Past Medical History:   Diagnosis Date    Anxiety     Depression     Hyperlipidemia     Hypertension      Past Surgical History:   Procedure Laterality Date    HYSTERECTOMY       History reviewed. No pertinent family history.  Social History     Tobacco Use    Smoking status: Never Smoker    Smokeless tobacco: Never Used   Substance Use Topics    Alcohol use: Not Currently    Drug use: Never     Review of Systems   Constitutional: Negative.    HENT: Negative.    Eyes: Positive for photophobia and discharge. Negative for pain, redness, itching and visual disturbance.   Respiratory: Negative for apnea, cough, choking, chest tightness, shortness of breath, wheezing and stridor.    Cardiovascular: Negative for chest pain, palpitations and leg swelling.   Gastrointestinal: Negative.    Endocrine: Negative.    Genitourinary: Negative.    Musculoskeletal: Negative.    Skin: Negative.    Allergic/Immunologic: Negative.    Neurological: Positive for headaches. Negative for dizziness, tremors, seizures, syncope, facial asymmetry, speech difficulty, weakness, light-headedness and numbness.   Hematological: Negative.    Psychiatric/Behavioral: Negative.        Physical Exam     Initial Vitals [04/28/22 1908]   BP Pulse Resp Temp SpO2   (!) 173/91 83 14 98.4 °F (36.9 °C) 95 %      MAP       --          Physical Exam    Constitutional: She appears well-nourished. No distress.   HENT:   Head: Normocephalic.   Right Ear: Tympanic membrane and ear canal normal.   Left Ear: Tympanic membrane and ear canal normal.   Nose: Nose normal. Right sinus exhibits no maxillary sinus tenderness and no frontal sinus tenderness. Left sinus exhibits no maxillary sinus tenderness and no frontal sinus tenderness.   Mouth/Throat: Uvula is midline, oropharynx is clear and moist and mucous membranes are normal.   Eyes: Conjunctivae and EOM are normal. Pupils are equal, round, and reactive to light.   Neck: Neck supple.   Normal range of motion.   Full passive range of motion without pain.     Cardiovascular: Normal rate, regular rhythm, normal heart sounds and intact distal pulses. Exam reveals no gallop and no friction rub.    No murmur heard.  Pulmonary/Chest: Breath sounds normal. No respiratory distress. She has no wheezes. She has no rhonchi. She has no rales. She exhibits no tenderness.   Abdominal: Abdomen is soft. Bowel sounds are normal. She exhibits no distension and no mass. There is no abdominal tenderness. There is no rebound and no guarding.   Musculoskeletal:         General: Normal range of motion.      Cervical back: Full passive range of motion without pain, normal range of motion and neck supple.     Neurological: She is alert and oriented to person, place, and time. She has normal strength. No cranial nerve deficit or sensory deficit.   Skin: Skin is warm and dry. Capillary refill takes less than 2 seconds.   Psychiatric: She has a normal mood and affect. Her behavior is normal. Judgment and thought content normal.         Medical Screening Exam   See Full Note    ED Course   Procedures  Labs Reviewed - No data to display       Imaging Results    None          Medications   ketorolac injection 15 mg (15 mg Intravenous Given 4/28/22 1939)   prochlorperazine injection Soln 10 mg (10 mg Intravenous Given 4/28/22  2001)   diphenhydrAMINE injection 25 mg (25 mg Intravenous Given 4/28/22 2001)                 ED Course as of 04/28/22 2023   u Apr 28, 2022 2019 Patient states headache and nausea relieved. [N[place]]      ED Course User Index  [N[place]] KHALIDA Galicia          Clinical Impression:   Final diagnoses:  [R51.9] Frontal headache (Primary)          ED Disposition Condition    Discharge Stable        ED Prescriptions     None        Follow-up Information    None          KHALIDA Galicia  04/28/22 2023

## 2022-06-06 NOTE — PATIENT INSTRUCTIONS
Medication is a proven way to both treat and prevent migraines. But medication is only part of the story. It's also important to take good care of yourself and understand how to cope with migraine pain when it strikes.    The same lifestyle choices that promote overall good health can also reduce the frequency and severity of your migraines. Combining medication with behavioral measures and lifestyle can often be the most effective way to handle migraines.    At the first sign of a migraine, take a break and step away from whatever you're doing if possible. Turn off the lights. Migraines often increase sensitivity to light and sound. Relax in a dark, quiet room. Sleep if you can.  Try temperature therapy. Apply hot or cold compresses to your head or neck. Ice packs have a numbing effect, which may dull the sensation of pain. Hot packs and heating pads can relax tense muscles. Warm showers or baths may have a similar effect.  Drink a caffeinated beverage. In small amounts, caffeine alone can relieve migraine pain in the early stages or enhance the pain-reducing effects of acetaminophen (Tylenol, others) and aspirin.  Drinking too much caffeine too often can lead to withdrawal headaches later on. And having caffeine too late in the day may interfere with your sleep, which can also affect migraines.    Migraines may keep you from falling asleep or wake you up at night. Likewise, migraines are often triggered by a poor night's sleep. Establish regular sleep hours. Wake up and go to bed at the same time every day -- even on weekends. If you nap during the day, keep it short. Naps longer than 20 to 30 minutes may interfere with nighttime sleep.  Unwind at the end of the day. Anything that helps you relax can promote better sleep: listen to soothing music, soak in a warm bath or read a favorite book.    Watch what you eat and drink before bedtime. Intense exercise, heavy meals, caffeine, nicotine and alcohol can interfere  with sleep.    Minimize distractions. Save your bedroom for sleep and intimacy. Don't watch television or take work materials to bed. Close your bedroom door. Use a fan to muffle distracting noises.  Don't try so hard to sleep. The harder you try to sleep, the more awake you'll feel. If you can't fall asleep, read or do another quiet activity until you become drowsy.    Check your medications. Medications that contain caffeine or other stimulants -- including some medications to treat migraines -- may interfere with sleep.    Your eating habits can influence your migraines. Consider the basics:  Be consistent. Eat at about the same time every day.  Don't skip meals. Fasting increases the risk of migraines.  Keep a food journal. Keeping track of the foods you eat and when you experience migraines can help identify potential food triggers.  Avoid foods that trigger migraines. If you suspect that a certain food -- such as aged cheese, chocolate, caffeine or alcohol -- is triggering your migraines, eliminate it from your diet to see what happens.

## 2023-01-03 ENCOUNTER — OFFICE VISIT (OUTPATIENT)
Dept: FAMILY MEDICINE | Facility: CLINIC | Age: 63
End: 2023-01-03
Payer: COMMERCIAL

## 2023-01-03 VITALS
OXYGEN SATURATION: 95 % | DIASTOLIC BLOOD PRESSURE: 84 MMHG | BODY MASS INDEX: 30.32 KG/M2 | HEART RATE: 83 BPM | SYSTOLIC BLOOD PRESSURE: 142 MMHG | RESPIRATION RATE: 18 BRPM | WEIGHT: 182 LBS | TEMPERATURE: 98 F | HEIGHT: 65 IN

## 2023-01-03 DIAGNOSIS — R05.3 PERSISTENT COUGH FOR 3 WEEKS OR LONGER: Primary | ICD-10-CM

## 2023-01-03 DIAGNOSIS — J40 BRONCHITIS: ICD-10-CM

## 2023-01-03 PROBLEM — M51.36 DEGENERATIVE DISC DISEASE, LUMBAR: Status: ACTIVE | Noted: 2022-10-25

## 2023-01-03 PROCEDURE — 3077F PR MOST RECENT SYSTOLIC BLOOD PRESSURE >= 140 MM HG: ICD-10-PCS | Mod: ,,, | Performed by: REGISTERED NURSE

## 2023-01-03 PROCEDURE — 1160F PR REVIEW ALL MEDS BY PRESCRIBER/CLIN PHARMACIST DOCUMENTED: ICD-10-PCS | Mod: ,,, | Performed by: REGISTERED NURSE

## 2023-01-03 PROCEDURE — 1159F PR MEDICATION LIST DOCUMENTED IN MEDICAL RECORD: ICD-10-PCS | Mod: ,,, | Performed by: REGISTERED NURSE

## 2023-01-03 PROCEDURE — 99214 PR OFFICE/OUTPT VISIT, EST, LEVL IV, 30-39 MIN: ICD-10-PCS | Mod: ,,, | Performed by: REGISTERED NURSE

## 2023-01-03 PROCEDURE — 3008F PR BODY MASS INDEX (BMI) DOCUMENTED: ICD-10-PCS | Mod: ,,, | Performed by: REGISTERED NURSE

## 2023-01-03 PROCEDURE — 99214 OFFICE O/P EST MOD 30 MIN: CPT | Mod: ,,, | Performed by: REGISTERED NURSE

## 2023-01-03 PROCEDURE — 3079F DIAST BP 80-89 MM HG: CPT | Mod: ,,, | Performed by: REGISTERED NURSE

## 2023-01-03 PROCEDURE — 1159F MED LIST DOCD IN RCRD: CPT | Mod: ,,, | Performed by: REGISTERED NURSE

## 2023-01-03 PROCEDURE — 3079F PR MOST RECENT DIASTOLIC BLOOD PRESSURE 80-89 MM HG: ICD-10-PCS | Mod: ,,, | Performed by: REGISTERED NURSE

## 2023-01-03 PROCEDURE — 3008F BODY MASS INDEX DOCD: CPT | Mod: ,,, | Performed by: REGISTERED NURSE

## 2023-01-03 PROCEDURE — 3077F SYST BP >= 140 MM HG: CPT | Mod: ,,, | Performed by: REGISTERED NURSE

## 2023-01-03 PROCEDURE — 1160F RVW MEDS BY RX/DR IN RCRD: CPT | Mod: ,,, | Performed by: REGISTERED NURSE

## 2023-01-03 RX ORDER — PROMETHAZINE HYDROCHLORIDE AND DEXTROMETHORPHAN HYDROBROMIDE 6.25; 15 MG/5ML; MG/5ML
5 SYRUP ORAL EVERY 8 HOURS PRN
Qty: 150 ML | Refills: 0 | Status: SHIPPED | OUTPATIENT
Start: 2023-01-03 | End: 2023-01-13

## 2023-01-03 RX ORDER — IPRATROPIUM BROMIDE AND ALBUTEROL SULFATE 2.5; .5 MG/3ML; MG/3ML
3 SOLUTION RESPIRATORY (INHALATION) EVERY 6 HOURS PRN
Qty: 75 EACH | Refills: 0 | Status: SHIPPED | OUTPATIENT
Start: 2023-01-03

## 2023-01-03 RX ORDER — AZITHROMYCIN 250 MG/1
TABLET, FILM COATED ORAL
Qty: 6 TABLET | Refills: 0 | Status: SHIPPED | OUTPATIENT
Start: 2023-01-03 | End: 2023-01-08

## 2023-01-03 NOTE — PROGRESS NOTES
KHALIDA Mann        PATIENT NAME: Lola Buckner  : 1960  DATE: 1/3/23  MRN: [id number]      Billing Provider: KHALIDA Mann  Level of Service: [place] OFFICE/OUTPT VISIT, EST, LEVL IV, 30-39 MIN  Patient PCP Information       Provider PCP Type    Primary Doctor No General            Reason for Visit / Chief Complaint: Cough (Couple weeks )     Update PCP  Update Chief Complaint         History of Present Illness / Problem Focused Workflow      Cough  This is a recurrent problem. The current episode started 1 to 4 weeks ago. The problem has been waxing and waning. The problem occurs every few minutes. The cough is Non-productive. Associated symptoms include ear pain, myalgias and a sore throat. Pertinent negatives include no chest pain, chills, ear congestion, fever, headaches, nasal congestion, postnasal drip, rash, rhinorrhea, shortness of breath, weight loss or wheezing. The symptoms are aggravated by lying down, fumes, dust, pollens and stress. Risk factors for lung disease include smoking/tobacco exposure. She has tried OTC cough suppressant, rest, cool air and body position changes for the symptoms. Her past medical history is significant for bronchitis and environmental allergies. Diagnoed with Influenza the last week in 2022. Had COVID in . Stopped smoking about 4 years ago.     Review of Systems     Review of Systems   Constitutional:  Positive for fatigue. Negative for chills, fever and weight loss.   HENT:  Positive for ear pain and sore throat. Negative for postnasal drip and rhinorrhea.    Respiratory:  Positive for cough. Negative for shortness of breath and wheezing.    Cardiovascular:  Negative for chest pain.   Gastrointestinal: Negative.    Musculoskeletal:  Positive for myalgias.   Integumentary:  Negative for rash.   Allergic/Immunologic: Positive for environmental allergies.   Neurological:  Negative for headaches.      Medical / Social / Family History     Past  Medical History:   Diagnosis Date    Anxiety     Depression     Hyperlipidemia     Hypertension      Past Surgical History:   Procedure Laterality Date    HYSTERECTOMY       Social History  Ms. Lola Buckner  reports that she has never smoked. She has never used smokeless tobacco. She reports that she does not currently use alcohol. She reports that she does not use drugs.    Family History  Ms. Lola Buckner's family history is not on file.    Medications and Allergies     Medications  Outpatient Medications Marked as Taking for the 1/3/23 encounter (Office Visit) with KHALIDA Mann   Medication Sig Dispense Refill    ALPRAZolam (XANAX) 0.25 MG tablet Take 0.25 mg by mouth.      atorvastatin (LIPITOR) 40 MG tablet Take 40 mg by mouth.      buPROPion (WELLBUTRIN XL) 300 MG 24 hr tablet Take 300 mg by mouth.      fenofibrate 160 MG Tab Take 160 mg by mouth.      lisinopriL-hydrochlorothiazide (PRINZIDE,ZESTORETIC) 10-12.5 mg per tablet Take 1 tablet by mouth.      metoprolol succinate (TOPROL-XL) 50 MG 24 hr tablet Take 50 mg by mouth.       Allergies  Review of patient's allergies indicates:   Allergen Reactions    Meclizine Shortness Of Breath    Prednisone Other (See Comments), Hives and Shortness Of Breath     Hallucinations  Hallucinations      Shellfish containing products Hallucinations and Other (See Comments)     Physical Examination     Vitals:    01/03/23 1329   BP: (!) 142/84   Pulse: 83   Resp: 18   Temp: 98.2 °F (36.8 °C)     Physical Exam  Vitals and nursing note reviewed.   Constitutional:       Appearance: Normal appearance.   HENT:      Head: Normocephalic and atraumatic.      Nose: Nose normal.      Mouth/Throat:      Mouth: Mucous membranes are moist.      Pharynx: Oropharynx is clear.   Cardiovascular:      Rate and Rhythm: Normal rate and regular rhythm.      Heart sounds: Normal heart sounds.   Pulmonary:      Effort: Pulmonary effort is normal.      Breath sounds: Wheezing  present.   Abdominal:      General: Bowel sounds are normal.   Musculoskeletal:         General: Normal range of motion.      Cervical back: Normal range of motion.   Skin:     General: Skin is warm and dry.   Neurological:      Mental Status: She is alert and oriented to person, place, and time.        Assessment and Plan (including Health Maintenance)      Problem List  Smart Sets  Document Outside HM   Plan:   Persistent cough for 3 weeks or longer  -     X-Ray Chest PA And Lateral; Future; Expected date: 01/03/2023  -     albuterol-ipratropium (DUO-NEB) 2.5 mg-0.5 mg/3 mL nebulizer solution; Take 3 mLs by nebulization every 6 (six) hours as needed for Wheezing. Rescue  Dispense: 75 each; Refill: 0  -     promethazine-dextromethorphan (PROMETHAZINE-DM) 6.25-15 mg/5 mL Syrp; Take 5 mLs by mouth every 8 (eight) hours as needed (cough).  Dispense: 150 mL; Refill: 0    Bronchitis  -     azithromycin (Z-LAURA) 250 MG tablet; Take 2 tablets by mouth on day 1; Take 1 tablet by mouth on days 2-5  Dispense: 6 tablet; Refill: 0      Health Maintenance Due   Topic Date Due    Hepatitis C Screening  Never done    COVID-19 Vaccine (1) Never done    HIV Screening  Never done    TETANUS VACCINE  Never done    Colorectal Cancer Screening  Never done    Shingles Vaccine (1 of 2) Never done     Problem List Items Addressed This Visit    None  Visit Diagnoses       Persistent cough for 3 weeks or longer    -  Primary    Relevant Medications    albuterol-ipratropium (DUO-NEB) 2.5 mg-0.5 mg/3 mL nebulizer solution    promethazine-dextromethorphan (PROMETHAZINE-DM) 6.25-15 mg/5 mL Syrp    Other Relevant Orders    X-Ray Chest PA And Lateral (Completed)    Bronchitis        Relevant Medications    azithromycin (Z-LAURA) 250 MG tablet          Health Maintenance Topics with due status: Not Due       Topic Last Completion Date    Mammogram 10/25/2022    Lipid Panel 10/25/2022     No future appointments.     Patient Instructions   Increase  fluid intake to stay hydrated. Take all doses of antibiotic therapy as prescribed. Do not stop taking when symptoms resolve, complete dosing. May take Tylenol or ibuprofen for fever or pain. Stay active, move around at least every 2 hours when awake. Go to the ED for any worsened condition or difficulty breathing. Return to clinic if condition persists.      Medication for nebulizer machine sent to pharmacy. Inhale 1 vial every 4-6 hours as needed for cough, wheezing, or shortness of breath.   Follow up if symptoms worsen or fail to improve.     Signature:  KHALIDA Mann    Date of encounter: 1/3/23

## 2023-01-03 NOTE — PATIENT INSTRUCTIONS
Increase fluid intake to stay hydrated. Take all doses of antibiotic therapy as prescribed. Do not stop taking when symptoms resolve, complete dosing. May take Tylenol or ibuprofen for fever or pain. Stay active, move around at least every 2 hours when awake. Go to the ED for any worsened condition or difficulty breathing. Return to clinic if condition persists.      Medication for nebulizer machine sent to pharmacy. Inhale 1 vial every 4-6 hours as needed for cough, wheezing, or shortness of breath.

## 2023-12-19 ENCOUNTER — PATIENT MESSAGE (OUTPATIENT)
Dept: ADMINISTRATIVE | Facility: HOSPITAL | Age: 63
End: 2023-12-19

## 2023-12-20 DIAGNOSIS — Z12.11 SCREENING FOR COLON CANCER: ICD-10-CM

## 2024-01-17 LAB — HEMOCCULT STL QL IA: NEGATIVE

## 2024-01-20 ENCOUNTER — PATIENT MESSAGE (OUTPATIENT)
Dept: ADMINISTRATIVE | Facility: HOSPITAL | Age: 64
End: 2024-01-20

## 2024-01-30 ENCOUNTER — OFFICE VISIT (OUTPATIENT)
Dept: FAMILY MEDICINE | Facility: CLINIC | Age: 64
End: 2024-01-30
Payer: COMMERCIAL

## 2024-01-30 VITALS
SYSTOLIC BLOOD PRESSURE: 144 MMHG | TEMPERATURE: 98 F | BODY MASS INDEX: 29.85 KG/M2 | WEIGHT: 179.19 LBS | DIASTOLIC BLOOD PRESSURE: 85 MMHG | HEIGHT: 65 IN | OXYGEN SATURATION: 94 % | HEART RATE: 63 BPM

## 2024-01-30 DIAGNOSIS — J06.9 UPPER RESPIRATORY INFECTION WITH COUGH AND CONGESTION: ICD-10-CM

## 2024-01-30 DIAGNOSIS — M54.2 NECK PAIN ON LEFT SIDE: ICD-10-CM

## 2024-01-30 DIAGNOSIS — H66.92 LEFT OTITIS MEDIA, UNSPECIFIED OTITIS MEDIA TYPE: Primary | ICD-10-CM

## 2024-01-30 PROCEDURE — 3008F BODY MASS INDEX DOCD: CPT | Mod: CPTII,,, | Performed by: NURSE PRACTITIONER

## 2024-01-30 PROCEDURE — 3077F SYST BP >= 140 MM HG: CPT | Mod: CPTII,,, | Performed by: NURSE PRACTITIONER

## 2024-01-30 PROCEDURE — 1160F RVW MEDS BY RX/DR IN RCRD: CPT | Mod: CPTII,,, | Performed by: NURSE PRACTITIONER

## 2024-01-30 PROCEDURE — 99213 OFFICE O/P EST LOW 20 MIN: CPT | Mod: ,,, | Performed by: NURSE PRACTITIONER

## 2024-01-30 PROCEDURE — 3079F DIAST BP 80-89 MM HG: CPT | Mod: CPTII,,, | Performed by: NURSE PRACTITIONER

## 2024-01-30 PROCEDURE — 1159F MED LIST DOCD IN RCRD: CPT | Mod: CPTII,,, | Performed by: NURSE PRACTITIONER

## 2024-01-30 RX ORDER — LORATADINE AND PSEUDOEPHEDRINE SULFATE 5; 120 MG/1; MG/1
1 TABLET, EXTENDED RELEASE ORAL 2 TIMES DAILY PRN
Qty: 20 TABLET | Refills: 0 | Status: SHIPPED | OUTPATIENT
Start: 2024-01-30 | End: 2024-02-09

## 2024-01-30 RX ORDER — PROMETHAZINE HYDROCHLORIDE AND DEXTROMETHORPHAN HYDROBROMIDE 6.25; 15 MG/5ML; MG/5ML
5 SYRUP ORAL EVERY 4 HOURS PRN
Qty: 180 ML | Refills: 0 | Status: SHIPPED | OUTPATIENT
Start: 2024-01-30 | End: 2024-02-09

## 2024-01-30 RX ORDER — AMOXICILLIN AND CLAVULANATE POTASSIUM 875; 125 MG/1; MG/1
1 TABLET, FILM COATED ORAL EVERY 12 HOURS
Qty: 14 TABLET | Refills: 0 | Status: SHIPPED | OUTPATIENT
Start: 2024-01-30 | End: 2024-02-06

## 2024-01-30 RX ORDER — ALBUTEROL SULFATE 90 UG/1
2 AEROSOL, METERED RESPIRATORY (INHALATION) EVERY 6 HOURS PRN
Qty: 18 G | Refills: 0 | Status: SHIPPED | OUTPATIENT
Start: 2024-01-30 | End: 2025-01-29

## 2024-01-30 RX ORDER — METHOCARBAMOL 750 MG/1
750 TABLET, FILM COATED ORAL 4 TIMES DAILY PRN
Qty: 30 TABLET | Refills: 0 | Status: SHIPPED | OUTPATIENT
Start: 2024-01-30 | End: 2024-02-09

## 2024-01-30 NOTE — PATIENT INSTRUCTIONS
Use the Claritin D and Flonase to help relieve upper respiratory symptoms  Cough syrup sent over to use as needed  Take full course of antibiotics until completed even if symptoms have completely resolved prior to finishing therapy  Read through discharge teaching on neck stretches, there is some other good information and there that would be helpful to you.  Take Motrin in your muscle relaxer it adjunct to home remedies help with the pain.  Make sure not to drive while taking the muscle relaxer because it can cause sedation.

## 2024-01-30 NOTE — PROGRESS NOTES
KHALIDA Gonsales   [place] [address] [facility] [place]  [address]     PATIENT NAME: Lola Buckner  : 1960  DATE: 24  MRN: [id number]      Billing Provider: KHALIDA Gonsales  Level of Service: IN OFFICE/OUTPT VISIT, EST, LEVL III, 20-29 MIN  Patient PCP Information       Provider PCP Type    José Miguel Uriostegui MD General            Reason for Visit / Chief Complaint: Cough, Sinus Problem, Neck Pain, Nasal Congestion (Pt c/o having sinus symptoms for a few weeks now/ and neck pain for about a month ), and Medication Refill (Need inhaler refilled )       Update PCP  Update Chief Complaint         History of Present Illness / Problem Focused Workflow     Lola Buckner presents to the clinic with Cough, Sinus Problem, Neck Pain, Nasal Congestion (Pt c/o having sinus symptoms for a few weeks now/ and neck pain for about a month ), and Medication Refill (Need inhaler refilled )     63-year-old female presents for ongoing upper respiratory congestion persistent cough times 3-4 weeks despite over-the-counter cough cold medications.  She did have a little cough syrup left over from last time she was written something for call which help with a little bit of relief.  She has also had left lateral neck pain for 1-2 months.  Denies any specific injury, just noticed pain in the left side of her neck that worsen.  Aggravated by turning head to the left.  Also would like a refill on her inhaler while she is here.    Cough  Associated symptoms include ear pain. Pertinent negatives include no wheezing.   Sinus Problem  Associated symptoms include coughing, ear pain, neck pain and sinus pressure.   Neck Pain     Medication Refill  Associated symptoms include coughing and neck pain.       Review of Systems     Review of Systems   Constitutional: Negative.    HENT:  Positive for ear pain and sinus pressure/congestion.    Eyes: Negative.    Respiratory:  Positive for cough. Negative for  wheezing.    Cardiovascular: Negative.    Gastrointestinal: Negative.    Endocrine: Negative.    Musculoskeletal:  Positive for neck pain.   Integumentary:  Negative.   Neurological: Negative.    Psychiatric/Behavioral: Negative.     All other systems reviewed and are negative.       Medical / Social / Family History     Past Medical History:   Diagnosis Date    Anxiety     Depression     Hyperlipidemia     Hypertension        Past Surgical History:   Procedure Laterality Date    HYSTERECTOMY         Social History  Ms. Buckner  reports that she has never smoked. She has never been exposed to tobacco smoke. She has never used smokeless tobacco. She reports that she does not currently use alcohol. She reports that she does not use drugs.    Family History  Ms.'s Buckner family history is not on file.    Medications and Allergies     Medications  Outpatient Medications Marked as Taking for the 1/30/24 encounter (Office Visit) with Marion Jacobs FNP   Medication Sig Dispense Refill    albuterol-ipratropium (DUO-NEB) 2.5 mg-0.5 mg/3 mL nebulizer solution Take 3 mLs by nebulization every 6 (six) hours as needed for Wheezing. Rescue 75 each 0    ALPRAZolam (XANAX) 0.25 MG tablet Take 0.25 mg by mouth.      atorvastatin (LIPITOR) 40 MG tablet Take 40 mg by mouth.      buPROPion (WELLBUTRIN XL) 300 MG 24 hr tablet Take 300 mg by mouth.      fenofibrate 160 MG Tab Take 160 mg by mouth.      lisinopriL-hydrochlorothiazide (PRINZIDE,ZESTORETIC) 10-12.5 mg per tablet Take 1 tablet by mouth.      metoprolol succinate (TOPROL-XL) 50 MG 24 hr tablet Take 50 mg by mouth.         Allergies  Review of patient's allergies indicates:   Allergen Reactions    Meclizine Shortness Of Breath    Prednisone Hives, Other (See Comments) and Shortness Of Breath     Hallucinations    Hallucinations   Hallucinations   Hallucinations   Hallucinations   Hallucinations    Shellfish containing products Hallucinations, Other (See Comments) and  Hives     Other Reaction(s): Confusion/delerium    Throat closes       Physical Examination     Vitals:    01/30/24 0908   BP: (!) 144/85   Pulse: 63   Temp: 97.8 °F (36.6 °C)     Physical Exam  Vitals and nursing note reviewed.   Constitutional:       Appearance: Normal appearance. She is normal weight.   HENT:      Head: Normocephalic and atraumatic.      Right Ear: Tympanic membrane, ear canal and external ear normal.      Left Ear: Ear canal and external ear normal. A middle ear effusion is present. Tympanic membrane is erythematous and bulging.      Nose: Congestion present.      Mouth/Throat:      Mouth: Mucous membranes are moist.      Pharynx: Oropharynx is clear.   Eyes:      Extraocular Movements: Extraocular movements intact.      Conjunctiva/sclera: Conjunctivae normal.      Pupils: Pupils are equal, round, and reactive to light.   Cardiovascular:      Rate and Rhythm: Normal rate and regular rhythm.      Pulses: Normal pulses.      Heart sounds: Normal heart sounds.   Pulmonary:      Effort: Pulmonary effort is normal.      Breath sounds: Normal breath sounds.   Abdominal:      General: Abdomen is flat. Bowel sounds are normal.      Palpations: Abdomen is soft.   Musculoskeletal:         General: Normal range of motion.      Cervical back: Normal range of motion and neck supple. No erythema, signs of trauma or rigidity. Pain with movement (on left lateral aspect of neck) and muscular tenderness present. No spinous process tenderness. Normal range of motion.   Lymphadenopathy:      Cervical: No cervical adenopathy.   Skin:     General: Skin is warm and dry.      Capillary Refill: Capillary refill takes less than 2 seconds.   Neurological:      General: No focal deficit present.      Mental Status: She is alert and oriented to person, place, and time. Mental status is at baseline.   Psychiatric:         Mood and Affect: Mood normal.         Behavior: Behavior normal.         Thought Content: Thought  content normal.         Judgment: Judgment normal.              Assessment and Plan (including Health Maintenance)      Problem List  Smart Sets  Document Outside HM   :    Plan:   Left otitis media, unspecified otitis media type  -     amoxicillin-clavulanate 875-125mg (AUGMENTIN) 875-125 mg per tablet; Take 1 tablet by mouth every 12 (twelve) hours. for 7 days  Dispense: 14 tablet; Refill: 0    Upper respiratory infection with cough and congestion  -     amoxicillin-clavulanate 875-125mg (AUGMENTIN) 875-125 mg per tablet; Take 1 tablet by mouth every 12 (twelve) hours. for 7 days  Dispense: 14 tablet; Refill: 0  -     loratadine-pseudoephedrine 5-120 mg (CLARITIN-D 12 HOUR) 5-120 mg per tablet; Take 1 tablet by mouth 2 (two) times daily as needed for Allergies (Congestion).  Dispense: 20 tablet; Refill: 0  -     promethazine-dextromethorphan (PROMETHAZINE-DM) 6.25-15 mg/5 mL Syrp; Take 5 mLs by mouth every 4 (four) hours as needed (Cough).  Dispense: 180 mL; Refill: 0  -     albuterol (VENTOLIN HFA) 90 mcg/actuation inhaler; Inhale 2 puffs into the lungs every 6 (six) hours as needed for Wheezing. Rescue  Dispense: 18 g; Refill: 0    Neck pain on left side  -     methocarbamoL (ROBAXIN) 750 MG Tab; Take 1 tablet (750 mg total) by mouth 4 (four) times daily as needed (Muscle spasms, pain).  Dispense: 30 tablet; Refill: 0           Health Maintenance Due   Topic Date Due    Hepatitis C Screening  Never done    COVID-19 Vaccine (1) Never done    HIV Screening  Never done    Colorectal Cancer Screening  Never done    Shingles Vaccine (1 of 2) Never done    RSV Vaccine (Age 60+ and Pregnant patients) (1 - 1-dose 60+ series) Never done       Problem List Items Addressed This Visit    None  Visit Diagnoses       Left otitis media, unspecified otitis media type    -  Primary    Upper respiratory infection with cough and congestion        Relevant Medications    albuterol (VENTOLIN HFA) 90 mcg/actuation inhaler    Neck  pain on left side                Health Maintenance Topics with due status: Not Due       Topic Last Completion Date    TETANUS VACCINE 04/21/2022    Hemoglobin A1c (Diabetic Prevention Screening) 10/26/2023    Mammogram 10/26/2023    Lipid Panel 10/26/2023       No future appointments.     Patient Instructions   Use the Claritin D and Flonase to help relieve upper respiratory symptoms  Cough syrup sent over to use as needed  Take full course of antibiotics until completed even if symptoms have completely resolved prior to finishing therapy  Read through discharge teaching on neck stretches, there is some other good information and there that would be helpful to you.  Take Motrin in your muscle relaxer it adjunct to home remedies help with the pain.  Make sure not to drive while taking the muscle relaxer because it can cause sedation.    Follow up if symptoms worsen or fail to improve.     Signature:  KHALIDA Gonsales      Date of encounter: 1/30/24